# Patient Record
Sex: FEMALE | Race: WHITE | NOT HISPANIC OR LATINO | Employment: OTHER | URBAN - METROPOLITAN AREA
[De-identification: names, ages, dates, MRNs, and addresses within clinical notes are randomized per-mention and may not be internally consistent; named-entity substitution may affect disease eponyms.]

---

## 2019-03-12 ENCOUNTER — TRANSFERRED RECORDS (OUTPATIENT)
Dept: HEALTH INFORMATION MANAGEMENT | Facility: CLINIC | Age: 64
End: 2019-03-12

## 2019-04-24 ENCOUNTER — TRANSFERRED RECORDS (OUTPATIENT)
Dept: HEALTH INFORMATION MANAGEMENT | Facility: CLINIC | Age: 64
End: 2019-04-24

## 2022-04-26 ENCOUNTER — TRANSFERRED RECORDS (OUTPATIENT)
Dept: MULTI SPECIALTY CLINIC | Facility: CLINIC | Age: 67
End: 2022-04-26

## 2022-06-18 ENCOUNTER — TRANSFERRED RECORDS (OUTPATIENT)
Dept: HEALTH INFORMATION MANAGEMENT | Facility: CLINIC | Age: 67
End: 2022-06-18

## 2022-11-15 ENCOUNTER — TRANSFERRED RECORDS (OUTPATIENT)
Dept: HEALTH INFORMATION MANAGEMENT | Facility: CLINIC | Age: 67
End: 2022-11-15

## 2023-01-04 ENCOUNTER — TRANSFERRED RECORDS (OUTPATIENT)
Dept: HEALTH INFORMATION MANAGEMENT | Facility: CLINIC | Age: 68
End: 2023-01-04

## 2023-03-01 ENCOUNTER — TRANSFERRED RECORDS (OUTPATIENT)
Dept: MULTI SPECIALTY CLINIC | Facility: CLINIC | Age: 68
End: 2023-03-01

## 2023-08-14 ENCOUNTER — TRANSFERRED RECORDS (OUTPATIENT)
Dept: HEALTH INFORMATION MANAGEMENT | Facility: CLINIC | Age: 68
End: 2023-08-14

## 2024-01-15 ENCOUNTER — TRANSFERRED RECORDS (OUTPATIENT)
Dept: HEALTH INFORMATION MANAGEMENT | Facility: CLINIC | Age: 69
End: 2024-01-15

## 2024-03-22 ENCOUNTER — TRANSFERRED RECORDS (OUTPATIENT)
Dept: HEALTH INFORMATION MANAGEMENT | Facility: CLINIC | Age: 69
End: 2024-03-22

## 2024-03-27 ENCOUNTER — TRANSFERRED RECORDS (OUTPATIENT)
Dept: HEALTH INFORMATION MANAGEMENT | Facility: CLINIC | Age: 69
End: 2024-03-27

## 2024-06-29 ENCOUNTER — HEALTH MAINTENANCE LETTER (OUTPATIENT)
Age: 69
End: 2024-06-29

## 2024-08-20 ENCOUNTER — OFFICE VISIT (OUTPATIENT)
Dept: INTERNAL MEDICINE | Facility: CLINIC | Age: 69
End: 2024-08-20
Payer: COMMERCIAL

## 2024-08-20 VITALS
DIASTOLIC BLOOD PRESSURE: 68 MMHG | TEMPERATURE: 98.2 F | SYSTOLIC BLOOD PRESSURE: 124 MMHG | WEIGHT: 176.1 LBS | HEIGHT: 68 IN | HEART RATE: 77 BPM | BODY MASS INDEX: 26.69 KG/M2 | OXYGEN SATURATION: 99 % | RESPIRATION RATE: 15 BRPM

## 2024-08-20 DIAGNOSIS — E04.2 NONTOXIC MULTINODULAR GOITER: ICD-10-CM

## 2024-08-20 DIAGNOSIS — H40.9 GLAUCOMA OF BOTH EYES, UNSPECIFIED GLAUCOMA TYPE: ICD-10-CM

## 2024-08-20 DIAGNOSIS — H81.13 BENIGN PAROXYSMAL POSITIONAL VERTIGO DUE TO BILATERAL VESTIBULAR DISORDER: Primary | ICD-10-CM

## 2024-08-20 DIAGNOSIS — K21.00 GASTROESOPHAGEAL REFLUX DISEASE WITH ESOPHAGITIS WITHOUT HEMORRHAGE: ICD-10-CM

## 2024-08-20 DIAGNOSIS — R73.03 PREDIABETES: ICD-10-CM

## 2024-08-20 DIAGNOSIS — R13.19 ESOPHAGEAL DYSPHAGIA: ICD-10-CM

## 2024-08-20 DIAGNOSIS — K22.2 SCHATZKI'S RING: ICD-10-CM

## 2024-08-20 PROBLEM — C44.310 BASAL CELL CARCINOMA (BCC) OF SKIN OF FACE: Status: ACTIVE | Noted: 2020-10-22

## 2024-08-20 PROBLEM — Z86.32 HISTORY OF GESTATIONAL DIABETES MELLITUS: Status: ACTIVE | Noted: 2024-08-20

## 2024-08-20 PROBLEM — D12.3 BENIGN NEOPLASM OF TRANSVERSE COLON: Status: ACTIVE | Noted: 2019-04-26

## 2024-08-20 PROBLEM — K20.80: Status: ACTIVE | Noted: 2024-03-27

## 2024-08-20 PROBLEM — Z86.0100 HISTORY OF COLONIC POLYPS: Status: ACTIVE | Noted: 2019-10-07

## 2024-08-20 PROCEDURE — G2211 COMPLEX E/M VISIT ADD ON: HCPCS | Performed by: INTERNAL MEDICINE

## 2024-08-20 PROCEDURE — 99204 OFFICE O/P NEW MOD 45 MIN: CPT | Performed by: INTERNAL MEDICINE

## 2024-08-20 RX ORDER — CALCIUM CARBONATE/VITAMIN D3 500 MG-10
2 TABLET ORAL DAILY
COMMUNITY

## 2024-08-20 RX ORDER — CHOLECALCIFEROL (VITAMIN D3) 50 MCG
TABLET ORAL
COMMUNITY
Start: 2020-11-30

## 2024-08-20 RX ORDER — ZINC GLUCONATE 50 MG
TABLET ORAL
COMMUNITY
Start: 2020-11-30

## 2024-08-20 RX ORDER — ASCORBIC ACID 100 MG
TABLET,CHEWABLE ORAL
COMMUNITY
Start: 2020-11-19 | End: 2024-10-07

## 2024-08-20 RX ORDER — CHLORAL HYDRATE 500 MG
1000 CAPSULE ORAL
COMMUNITY

## 2024-08-20 RX ORDER — MECLIZINE HYDROCHLORIDE 25 MG/1
25 TABLET ORAL 3 TIMES DAILY PRN
Qty: 30 TABLET | Refills: 1 | Status: SHIPPED | OUTPATIENT
Start: 2024-08-20 | End: 2024-10-07

## 2024-08-20 RX ORDER — LATANOPROST 50 UG/ML
SOLUTION/ DROPS OPHTHALMIC
COMMUNITY

## 2024-08-20 RX ORDER — NEOMYCIN SULFATE, POLYMYXIN B SULFATE AND DEXAMETHASONE 3.5; 10000; 1 MG/ML; [USP'U]/ML; MG/ML
SUSPENSION/ DROPS OPHTHALMIC
COMMUNITY
Start: 2024-01-04

## 2024-08-20 NOTE — PROGRESS NOTES
"  Assessment & Plan   Problem List Items Addressed This Visit          Nervous and Auditory    Benign paroxysmal positional vertigo due to bilateral vestibular disorder - Primary     Patient's history and positive Katja-Hallpike on exam consistent with BPPV.  I did discuss this with the patient today.  Possibly onset while she was on a boat in [place] earlier this year.  Given duration of time, I do think seeing PT for Epley maneuvers is likely necessary.  - Vestibular PT ordered  - Info on BPPV and Epley maneuvers provided in AVS  - Prescription for meclizine as needed also given         Relevant Medications    meclizine (ANTIVERT) 25 MG tablet    Other Relevant Orders    Physical Therapy  Referral       Digestive    Esophageal dysphagia     Ongoing. Has hx of Schatzki's rings dilated x3 in the past (2019, 2022 and 2024) along with esophageal abscess 3/2024. She is following with [facility] next follow up is planned in September.  - LISA for [facility] filled out today         Gastroesophageal reflux disease with esophagitis without hemorrhage     Well-controlled with omeprazole 20 mg daily.  Given history of Schatzki's rings that had to be dilated x 3, will continue chronic PPI.         Schatzki's ring     Follows at [facility].  Would plan to continue Protonix 20 mg daily indefinitely.            Endocrine    Nontoxic multinodular goiter     Chronic. Last US 3/5/24, showed stable/smaller thyroid nodules. Largest 1.1 cm - was previously TIRADS4 on US 3/2023 (f/up recommended 1, 2, 3 and 5 years).  - Plan for f/up of the TIRADS4 nodule 3/2026, 3/2027 and 3/2029         Prediabetes     Last A1c 5.9 (2/2024).  Discussed healthy diet and exercise for management.            Other    Glaucoma     Follows with ophthalmology for eyedrops.             BMI  Estimated body mass index is 26.97 kg/m  as calculated from the following:    Height as of this encounter: 1.721 m (5' 7.75\").    Weight as of this encounter: 79.9 kg (176 lb 1.6 " oz).   Weight management plan: Discussed healthy diet and exercise guidelines    Review of prior external note(s) from - [facility]Everywhere information from [facility] reviewed  Prescription drug management      The longitudinal plan of care for the diagnosis(es)/condition(s) as documented were addressed during this visit. Due to the added complexity in care, I will continue to support Merry in the subsequent management and with ongoing continuity of care.    FUTURE APPOINTMENTS:       - Follow-up visit in 2 months with colleague to ensure dizziness resolved with PT, then me in February for wellness       Subjective   Merry is a 68 year old, presenting for the following health issues:  Establish Care        8/20/2024     4:12 PM   Additional Questions   Roomed by Allie     Via the Health Maintenance questionnaire, the patient has reported the following services have been completed -Colonscopy: [place] 2022-04-26, this information has been sent to the abstraction team.    History of Present Illness     Reason for visit:  To establish visit with new doctor and confirm my heart issues are not concerning    We reviewed her medical history as below along with current concerns. I also reviewed her outside chart from [facility].     Esophageal abscess: Admitted with dysphagia 3/22/2024.  Upper endoscopy and EUS showed submucosal cystic lesions that was biopsied and drained. Final pathology from the biopsies showed benign squamous mucosa with associated inflammation and granulation tissue, consistant with submucosal abscess.   - Does have ongoing dysphagia, following with [facility], they might do barium, next f/up is September     Schatzki's ring: cold forcep disruption (2019), dilation 2022 and 2024.     GERD: omeprazole 20 mg daily     Glaucoma: Drops through ophthalmology    Pre-DM: A1c 5.9 (2/2024). Also hx of gestational DM with both pregnancies. Walks for exercise occasionally. Babysits 2 year old granddaughter.  "    Dizziness / one syncopal episode: TTE 3/23/24 LVEF 71%, normal RV size and function, no valve disease. Negative exercise stress 4/30/24.   - Had an episode last week Friday where she stood up from standing, walked over to take a picture, lost balance and fell over. No LOC. Got right up and felt fine.   - Lately has felt \"wonky\". Describes feeling dizzy if she turns too quick. Since January when she was in [place]. Few times a week.   - Occasionally heart racing when she is trying to relax at end of day, resolves with going to sleep   - 48-60 oz of water most days and a G2 in addition to that to help with cramping/electrolytes   - Some dizziness sitting her today, describes as room spinning.     Multinodular goiter: Last US 3/5/24, showed stable/smaller thyroid nodules. Largest 1.1 cm - was previously TIRADS4 on US 3/2023 (f/up recommended 1, 2, 3 and 5 years).    HCM: Mammo BIRADS1 3/5/24. DEXA normal 3/2023.     She eats 2-3 servings of fruits and vegetables daily.She consumes 1 sweetened beverage(s) daily.She exercises with enough effort to increase her heart rate 30 to 60 minutes per day.  She exercises with enough effort to increase her heart rate 3 or less days per week.   She is taking medications regularly.      Review of Systems  Constitutional, neuro, ENT, endocrine, pulmonary, cardiac, gastrointestinal, genitourinary, musculoskeletal, integument and psychiatric systems are negative, except as otherwise noted.      Objective    /68   Pulse 77   Temp 98.2  F (36.8  C) (Oral)   Resp 15   Ht 1.721 m (5' 7.75\")   Wt 79.9 kg (176 lb 1.6 oz)   SpO2 99%   BMI 26.97 kg/m    Body mass index is 26.97 kg/m .  Physical Exam   GENERAL: alert and no distress  EYES: Eyes grossly normal to inspection and conjunctivae and sclerae normal  HENT: nose and mouth without ulcers or lesions  NECK: no adenopathy, no asymmetry, masses, or scars  RESP: lungs clear to auscultation - no rales, rhonchi or " wheezes  CV: regular rate and rhythm, normal S1 S2, no S3 or S4, no murmur, click or rub, no peripheral edema  MS: no gross musculoskeletal defects noted, no edema  SKIN: no suspicious lesions or rashes on exposed skin   NEURO: Normal strength and tone, mentation intact and speech normal; +[place] Hallpike when patient turned to her R with nystagmus and onset of dizziness  PSYCH: mentation appears normal, affect normal/bright            Signed Electronically by: Noris Rojas MD

## 2024-08-22 NOTE — ASSESSMENT & PLAN NOTE
Patient's history and positive Katja-Hallpike on exam consistent with BPPV.  I did discuss this with the patient today.  Possibly onset while she was on a boat in [place] earlier this year.  Given duration of time, I do think seeing PT for Epley maneuvers is likely necessary.  - Vestibular PT ordered  - Info on BPPV and Epley maneuvers provided in AVS  - Prescription for meclizine as needed also given

## 2024-08-22 NOTE — ASSESSMENT & PLAN NOTE
Ongoing. Has hx of Schatzki's rings dilated x3 in the past (2019, 2022 and 2024) along with esophageal abscess 3/2024. She is following with [facility] next follow up is planned in September.  - LISA for [facility] filled out today

## 2024-08-22 NOTE — ASSESSMENT & PLAN NOTE
Chronic. Last US 3/5/24, showed stable/smaller thyroid nodules. Largest 1.1 cm - was previously TIRADS4 on US 3/2023 (f/up recommended 1, 2, 3 and 5 years).  - Plan for f/up of the TIRADS4 nodule 3/2026, 3/2027 and 3/2029

## 2024-08-22 NOTE — ASSESSMENT & PLAN NOTE
Well-controlled with omeprazole 20 mg daily.  Given history of Schatzki's rings that had to be dilated x 3, will continue chronic PPI.

## 2024-08-22 NOTE — ASSESSMENT & PLAN NOTE
Follows at [facility].  Would plan to continue Protonix 20 mg daily indefinitely.

## 2024-09-28 ENCOUNTER — ANCILLARY ORDERS (OUTPATIENT)
Dept: CT IMAGING | Facility: HOSPITAL | Age: 69
End: 2024-09-28

## 2024-09-28 DIAGNOSIS — R10.32 LEFT LOWER QUADRANT PAIN: Primary | ICD-10-CM

## 2024-09-28 DIAGNOSIS — R13.10 DYSPHAGIA, UNSPECIFIED TYPE: ICD-10-CM

## 2024-10-07 ENCOUNTER — OFFICE VISIT (OUTPATIENT)
Dept: FAMILY MEDICINE | Facility: CLINIC | Age: 69
End: 2024-10-07
Payer: COMMERCIAL

## 2024-10-07 VITALS
TEMPERATURE: 98 F | WEIGHT: 176 LBS | HEIGHT: 68 IN | DIASTOLIC BLOOD PRESSURE: 83 MMHG | RESPIRATION RATE: 18 BRPM | SYSTOLIC BLOOD PRESSURE: 125 MMHG | OXYGEN SATURATION: 95 % | HEART RATE: 89 BPM | BODY MASS INDEX: 26.67 KG/M2

## 2024-10-07 DIAGNOSIS — R13.19 ESOPHAGEAL DYSPHAGIA: ICD-10-CM

## 2024-10-07 DIAGNOSIS — Z01.818 PRE-OP EVALUATION: Primary | ICD-10-CM

## 2024-10-07 DIAGNOSIS — R73.03 PREDIABETES: ICD-10-CM

## 2024-10-07 LAB
ANION GAP SERPL CALCULATED.3IONS-SCNC: 10 MMOL/L (ref 7–15)
ATRIAL RATE - MUSE: 68 BPM
BUN SERPL-MCNC: 12.2 MG/DL (ref 8–23)
CALCIUM SERPL-MCNC: 9.5 MG/DL (ref 8.8–10.4)
CHLORIDE SERPL-SCNC: 103 MMOL/L (ref 98–107)
CREAT SERPL-MCNC: 0.81 MG/DL (ref 0.51–0.95)
DIASTOLIC BLOOD PRESSURE - MUSE: NORMAL MMHG
EGFRCR SERPLBLD CKD-EPI 2021: 79 ML/MIN/1.73M2
EST. AVERAGE GLUCOSE BLD GHB EST-MCNC: 123 MG/DL
GLUCOSE SERPL-MCNC: 121 MG/DL (ref 70–99)
HBA1C MFR BLD: 5.9 % (ref 0–5.6)
HCO3 SERPL-SCNC: 26 MMOL/L (ref 22–29)
INTERPRETATION ECG - MUSE: NORMAL
P AXIS - MUSE: 37 DEGREES
POTASSIUM SERPL-SCNC: 4.7 MMOL/L (ref 3.4–5.3)
PR INTERVAL - MUSE: 136 MS
QRS DURATION - MUSE: 74 MS
QT - MUSE: 378 MS
QTC - MUSE: 401 MS
R AXIS - MUSE: 9 DEGREES
SODIUM SERPL-SCNC: 139 MMOL/L (ref 135–145)
SYSTOLIC BLOOD PRESSURE - MUSE: NORMAL MMHG
T AXIS - MUSE: 26 DEGREES
VENTRICULAR RATE- MUSE: 68 BPM

## 2024-10-07 PROCEDURE — 99214 OFFICE O/P EST MOD 30 MIN: CPT | Performed by: FAMILY MEDICINE

## 2024-10-07 PROCEDURE — 83036 HEMOGLOBIN GLYCOSYLATED A1C: CPT | Performed by: FAMILY MEDICINE

## 2024-10-07 PROCEDURE — 80048 BASIC METABOLIC PNL TOTAL CA: CPT | Performed by: FAMILY MEDICINE

## 2024-10-07 PROCEDURE — 93005 ELECTROCARDIOGRAM TRACING: CPT | Performed by: FAMILY MEDICINE

## 2024-10-07 PROCEDURE — 36415 COLL VENOUS BLD VENIPUNCTURE: CPT | Performed by: FAMILY MEDICINE

## 2024-10-07 PROCEDURE — 93010 ELECTROCARDIOGRAM REPORT: CPT | Performed by: INTERNAL MEDICINE

## 2024-10-07 PROCEDURE — G2211 COMPLEX E/M VISIT ADD ON: HCPCS | Performed by: FAMILY MEDICINE

## 2024-10-07 ASSESSMENT — PAIN SCALES - GENERAL: PAINLEVEL: NO PAIN (0)

## 2024-10-07 NOTE — PROGRESS NOTES
Preoperative Evaluation  [place] [id number] [address]  Phone: [phone]  Fax: [phone]  Primary Provider: Noris Rojas MD  Pre-op Performing Provider: Obinna Shipley MD  Oct 7, 2024             10/2/2024   Surgical Information   What procedure is being done? Ultrasound Endoscopy   Facility or Hospital where procedure/surgery will be performed: [facility]   Who is doing the procedure / surgery? Jadiel Smith   Date of surgery / procedure: 10/22/2024   Time of surgery / procedure: 1:00pm   Where do you plan to recover after surgery? at home with family        Fax number for surgical facility: [phone]    Assessment & Plan     The proposed surgical procedure is considered LOW risk.    Problem List Items Addressed This Visit       Esophageal dysphagia    Prediabetes    Relevant Orders    Hemoglobin A1c (Completed)     Other Visit Diagnoses       Pre-op evaluation    -  Primary    Relevant Orders    EKG 12-lead, tracing only (Completed)    CBC with platelets    Basic metabolic panel (Completed)    CBC with platelets                - No identified additional risk factors other than previously addressed    Antiplatelet or Anticoagulation Medication Instructions   - Patient is on no antiplatelet or anticoagulation medications.    Additional Medication Instructions  Take all scheduled medications on the day of surgery    Recommendation  Approval given to proceed with proposed procedure, without further diagnostic evaluation.      The longitudinal plan of care for the diagnosis(es)/condition(s) as documented were addressed during this visit. Due to the added complexity in care, I will continue to support Merry in the subsequent management and with ongoing continuity of care.        Hina Sousa is a 69 year old, presenting for the following:  Pre-Op Exam          10/7/2024    12:01 PM   Additional Questions    Roomed by IGLESIA Phillips   Accompanied by Self     HPI related to upcoming procedure: dysphagia   history of submucosal abscess.           10/2/2024   Pre-Op Questionnaire   Have you ever had a heart attack or stroke? No   Have you ever had surgery on your heart or blood vessels, such as a stent placement, a coronary artery bypass, or surgery on an artery in your head, neck, heart, or legs? No   Do you have chest pain with activity? No   Do you have a history of heart failure? No   Do you currently have a cold, bronchitis or symptoms of other infection? No   Do you have a cough, shortness of breath, or wheezing? No   Do you or anyone in your family have previous history of blood clots? No   Do you or does anyone in your family have a serious bleeding problem such as prolonged bleeding following surgeries or cuts? (!) YES    Have you ever had problems with anemia or been told to take iron pills? No   Have you had any abnormal blood loss such as black, tarry or bloody stools, or abnormal vaginal bleeding? No   Have you ever had a blood transfusion? No   Are you willing to have a blood transfusion if it is medically needed before, during, or after your surgery? Yes   Have you or any of your relatives ever had problems with anesthesia? No   Do you have sleep apnea, excessive snoring or daytime drowsiness? No   Do you have any artifical heart valves or other implanted medical devices like a pacemaker, defibrillator, or continuous glucose monitor? No   Do you have artificial joints? No   Are you allergic to latex? No        Health Care Directive  Patient does not have a Health Care Directive or Living Will: Discussed advance care planning with patient; information given to patient to review.    Preoperative Review of    reviewed - no record of controlled substances prescribed.          Patient Active Problem List    Diagnosis Date Noted    History of gestational diabetes mellitus 08/20/2024     Priority: Medium     Benign paroxysmal positional vertigo due to bilateral vestibular disorder 08/20/2024     Priority: Medium    Abscess of esophagus 03/27/2024     Priority: Medium     Formatting of this note might be different from the original.   Noted on EGD 3/2024, biopsies/EUS done.  Pathology showed submucosal abscess, which was biopsied/drained at the time of EUS, benign pathology.      Esophageal dysphagia 03/23/2024     Priority: Medium    Glaucoma 02/20/2024     Priority: Medium    Basal cell carcinoma (BCC) of skin of face 10/22/2020     Priority: Medium     Formatting of this note might be different from the original.   Right shoulder - [facility]      History of colonic polyps 10/07/2019     Priority: Medium     Formatting of this note might be different from the original.   Adenomatous polyps 4/2019, 3 year follow up.      Benign neoplasm of transverse colon 04/26/2019     Priority: Medium    Schatzki's ring 04/19/2019     Priority: Medium    Hyperlipidemia 10/07/2015     Priority: Medium    Prediabetes 10/07/2015     Priority: Medium    Gastroesophageal reflux disease with esophagitis without hemorrhage 11/09/2012     Priority: Medium     Formatting of this note might be different from the original.   Overview: well controlled with Pepcid AC.  Has Hiatal Hernia also.      Nontoxic multinodular goiter 11/02/1999     Priority: Medium      No past medical history on file.  No past surgical history on file.  Current Outpatient Medications   Medication Sig Dispense Refill    Ascorbic Acid 250 MG CHEW Take 250 mg by mouth      Calcium Carb-Cholecalciferol 500-10 MG-MCG TABS Take 2 tablets by mouth daily      fish oil-omega-3 fatty acids 1000 MG capsule Take 1,000 mg by mouth      latanoprost (XALATAN) 0.005 % ophthalmic solution INSTILL 1 DROP IN BOTH EYES EVERY EVENING      MAGNESIUM PO Take 200 mg by mouth      omeprazole (PRILOSEC) 20 MG DR capsule       vitamin D3 (CHOLECALCIFEROL) 50 mcg (2000 units) tablet        "zinc gluconate 50 MG tablet       neomycin-polymyxin-dexAMETHasone (MAXITROL) 3.5-39028-3.1 SUSP ophthalmic susp SHAKE LIQUID AND INSTILL 1 DROP IN RIGHT EYE THREE TIMES DAILY         Allergies   Allergen Reactions    Bee Venom Itching and Swelling    Gabapentin Itching        Social History     Tobacco Use    Smoking status: Never    Smokeless tobacco: Never   Substance Use Topics    Alcohol use: Not on file       History   Drug Use Not on file               Objective    /83 (BP Location: Right arm, Patient Position: Sitting, Cuff Size: Adult Regular)   Pulse 89   Temp 98  F (36.7  C) (Oral)   Resp 18   Ht 1.721 m (5' 7.75\")   Wt 79.8 kg (176 lb)   LMP  (LMP Unknown)   SpO2 95%   BMI 26.96 kg/m     Estimated body mass index is 26.96 kg/m  as calculated from the following:    Height as of this encounter: 1.721 m (5' 7.75\").    Weight as of this encounter: 79.8 kg (176 lb).  Physical Exam  GENERAL: alert and no distress  EYES: Eyes grossly normal to inspection, PERRL and conjunctivae and sclerae normal  HENT: oropharynx clear and oral mucous membranes moist  NECK: no adenopathy, no asymmetry, masses, or scars  RESP: lungs clear to auscultation - no rales, rhonchi or wheezes  CV: regular rate and rhythm, normal S1 S2, no S3 or S4, no murmur, click or rub, no peripheral edema  ABDOMEN: soft, nontender, no hepatosplenomegaly, no masses and bowel sounds normal  MS: no gross musculoskeletal defects noted, no edema  SKIN: no suspicious lesions or rashes  NEURO: Normal strength and tone, mentation intact and speech normal  PSYCH: mentation appears normal, affect normal/bright    No results for input(s): \"HGB\", \"PLT\", \"INR\", \"NA\", \"POTASSIUM\", \"CR\", \"A1C\" in the last 8760 hours.     Diagnostics  Recent Results (from the past 48 hour(s))   EKG 12-lead, tracing only    Collection Time: 10/07/24 11:34 AM   Result Value Ref Range    Systolic Blood Pressure  mmHg    Diastolic Blood Pressure  mmHg    Ventricular Rate " 68 BPM    Atrial Rate 68 BPM    TN Interval 136 ms    QRS Duration 74 ms     ms    QTc 401 ms    P Axis 37 degrees    R AXIS 9 degrees    T Axis 26 degrees    Interpretation ECG       Sinus rhythm  Normal ECG  No previous ECGs available  Confirmed by ANTON IYER MD LOC:[facility] ([id number]) on 10/7/2024 5:54:10 PM     Basic metabolic panel    Collection Time: 10/07/24 12:52 PM   Result Value Ref Range    Sodium 139 135 - 145 mmol/L    Potassium 4.7 3.4 - 5.3 mmol/L    Chloride 103 98 - 107 mmol/L    Carbon Dioxide (CO2) 26 22 - 29 mmol/L    Anion Gap 10 7 - 15 mmol/L    Urea Nitrogen 12.2 8.0 - 23.0 mg/dL    Creatinine 0.81 0.51 - 0.95 mg/dL    GFR Estimate 79 >60 mL/min/1.73m2    Calcium 9.5 8.8 - 10.4 mg/dL    Glucose 121 (H) 70 - 99 mg/dL   Hemoglobin A1c    Collection Time: 10/07/24 12:52 PM   Result Value Ref Range    Estimated Average Glucose 123 (H) <117 mg/dL    Hemoglobin A1C 5.9 (H) 0.0 - 5.6 %          Revised Cardiac Risk Index (RCRI)  The patient has the following serious cardiovascular risks for perioperative complications:   - No serious cardiac risks = 0 points     RCRI Interpretation: 0 points: Class I (very low risk - 0.4% complication rate)         Signed Electronically by: Obinna Shipley MD  A copy of this evaluation report is provided to the requesting physician.

## 2024-10-08 ENCOUNTER — LAB (OUTPATIENT)
Dept: LAB | Facility: CLINIC | Age: 69
End: 2024-10-08
Payer: COMMERCIAL

## 2024-10-08 ENCOUNTER — HOSPITAL ENCOUNTER (OUTPATIENT)
Dept: CT IMAGING | Facility: HOSPITAL | Age: 69
Discharge: HOME | End: 2024-10-08
Attending: INTERNAL MEDICINE | Admitting: INTERNAL MEDICINE
Payer: COMMERCIAL

## 2024-10-08 DIAGNOSIS — R13.10 DYSPHAGIA, UNSPECIFIED TYPE: ICD-10-CM

## 2024-10-08 DIAGNOSIS — R10.32 LEFT LOWER QUADRANT PAIN: ICD-10-CM

## 2024-10-08 DIAGNOSIS — Z01.818 PRE-OP EVALUATION: ICD-10-CM

## 2024-10-08 LAB
ERYTHROCYTE [DISTWIDTH] IN BLOOD BY AUTOMATED COUNT: 13 % (ref 10–15)
HCT VFR BLD AUTO: 43.5 % (ref 35–47)
HGB BLD-MCNC: 14.7 G/DL (ref 11.7–15.7)
MCH RBC QN AUTO: 28.7 PG (ref 26.5–33)
MCHC RBC AUTO-ENTMCNC: 33.8 G/DL (ref 31.5–36.5)
MCV RBC AUTO: 85 FL (ref 78–100)
PLATELET # BLD AUTO: 164 10E3/UL (ref 150–450)
RBC # BLD AUTO: 5.12 10E6/UL (ref 3.8–5.2)
WBC # BLD AUTO: 6.8 10E3/UL (ref 4–11)

## 2024-10-08 PROCEDURE — 85027 COMPLETE CBC AUTOMATED: CPT

## 2024-10-08 PROCEDURE — 250N000009 HC RX 250

## 2024-10-08 PROCEDURE — 74177 CT ABD & PELVIS W/CONTRAST: CPT

## 2024-10-08 PROCEDURE — 36415 COLL VENOUS BLD VENIPUNCTURE: CPT

## 2024-10-08 PROCEDURE — 250N000011 HC RX IP 250 OP 636

## 2024-10-08 RX ORDER — IOPAMIDOL 755 MG/ML
86 INJECTION, SOLUTION INTRAVASCULAR ONCE
Status: COMPLETED | OUTPATIENT
Start: 2024-10-08 | End: 2024-10-08

## 2024-10-08 RX ADMIN — IOPAMIDOL 86 ML: 755 INJECTION, SOLUTION INTRAVENOUS at 11:21

## 2024-10-08 RX ADMIN — SODIUM CHLORIDE 72 ML: 9 INJECTION, SOLUTION INTRAVENOUS at 11:23

## 2024-10-13 ENCOUNTER — TRANSFERRED RECORDS (OUTPATIENT)
Dept: HEALTH INFORMATION MANAGEMENT | Facility: CLINIC | Age: 69
End: 2024-10-13
Payer: COMMERCIAL

## 2024-10-14 ENCOUNTER — TELEPHONE (OUTPATIENT)
Dept: INTERNAL MEDICINE | Facility: CLINIC | Age: 69
End: 2024-10-14
Payer: COMMERCIAL

## 2024-10-14 NOTE — TELEPHONE ENCOUNTER
Reason for Call:  Appointment Request    Patient requesting this type of appt: Chronic Diease Management/Medication/Follow-Up    Requested provider: Noris Rojas    Reason patient unable to be scheduled: Not within requested timeframe    When does patient want to be seen/preferred time: Same day    Comments: N/A    Could we send this information to you in [employer] [facility] or would you prefer to receive a phone call?:   Patient would prefer a phone call   Okay to leave a detailed message?: Yes at Cell number on file:    Telephone Information:   Mobile [phone]       Call taken on 10/14/2024 at 10:35 AM by Mary Ann Wu

## 2024-10-15 NOTE — TELEPHONE ENCOUNTER
Patient calling back     Patient was already informed about her appt with May     -Patient and writer agreed the message was taken but the pcp is out and message was replied by team

## 2024-10-15 NOTE — TELEPHONE ENCOUNTER
Left message for patient to call back. It appears patient has appt scheduled with colleague of Dr. Izquierdo which I would keep since Bob is out for the next 3months.

## 2024-10-24 ENCOUNTER — OFFICE VISIT (OUTPATIENT)
Dept: INTERNAL MEDICINE | Facility: CLINIC | Age: 69
End: 2024-10-24
Payer: COMMERCIAL

## 2024-10-24 VITALS
WEIGHT: 168 LBS | HEIGHT: 68 IN | HEART RATE: 68 BPM | OXYGEN SATURATION: 98 % | RESPIRATION RATE: 18 BRPM | SYSTOLIC BLOOD PRESSURE: 127 MMHG | DIASTOLIC BLOOD PRESSURE: 73 MMHG | BODY MASS INDEX: 25.46 KG/M2

## 2024-10-24 DIAGNOSIS — R93.89 ABNORMAL CHEST CT: Primary | ICD-10-CM

## 2024-10-24 DIAGNOSIS — R68.89 NONSPECIFIC ABNORMAL FINDING: ICD-10-CM

## 2024-10-24 PROCEDURE — 99213 OFFICE O/P EST LOW 20 MIN: CPT | Performed by: NURSE PRACTITIONER

## 2024-10-24 RX ORDER — POLYETHYLENE GLYCOL 3350 17 G/17G
1 POWDER, FOR SOLUTION ORAL DAILY
COMMUNITY

## 2024-10-24 ASSESSMENT — PAIN SCALES - GENERAL: PAINLEVEL_OUTOF10: NO PAIN (0)

## 2024-10-24 NOTE — PROGRESS NOTES
"  Assessment & Plan   Problem List Items Addressed This Visit    None  Visit Diagnoses       Abnormal chest CT    -  Primary    Normal endoscopic ultrasound of lower gastrointestinal tract            - We reviewed the report from her recent CT scan which showed either lung atelectasis or scarring.  I find an old report where atelectasis is mentioned, there is no prior report of scarring.  The significance of this finding is undetermined but she is not experiencing any respiratory symptoms.  She is advised to be alert to any changes such as coughing, shortness of breath, chest tightness.  Further management dependent on development of any symptoms but no specific follow-up is recommended based on these imaging findings alone  - We reviewed the recent endoscopic ultrasound showing \"nonspecific pancreatic parenchymal changes\".  Recent CT imaging is negative for any pancreatic abnormalities.  No left upper quadrant pain.  She is advised to follow-up with GI regarding these nonspecific findings.    Return in about 4 months (around 2/24/2025) for AWV with PCP .      Hina Sousa is a 69 year old, presenting for the following health issues:  Results (CT follow up )        10/24/2024     1:19 PM   Additional Questions   Roomed by IGLESIA Phillips   Accompanied by Self       Via the Health Maintenance questionnaire, the patient has reported the following services have been completed , this information has been sent to the abstraction team.      History of Present Illness       Reason for visit:  Follow up to a finding from CT Scan  Symptom onset:  More than a month  Symptoms include:  No symptoms  Had these symptoms before:  No   She is taking medications regularly.     She has questions from her recent CT scan and endoscopic ultrasound.        Objective    /73 (BP Location: Right arm, Patient Position: Sitting, Cuff Size: Adult Large)   Pulse 68   Resp 18   Ht 1.721 m (5' 7.75\")   Wt 76.2 kg (168 lb)   LMP "  (LMP Unknown)   SpO2 98%   BMI 25.73 kg/m    Body mass index is 25.73 kg/m .  Physical Exam   GENERAL: alert and no distress  RESP: lungs clear to auscultation - no rales, rhonchi or wheezes  CV: regular rate and rhythm, normal S1 S2            Signed Electronically by: May Bailey NP

## 2024-12-10 ENCOUNTER — TRANSFERRED RECORDS (OUTPATIENT)
Dept: HEALTH INFORMATION MANAGEMENT | Facility: CLINIC | Age: 69
End: 2024-12-10
Payer: COMMERCIAL

## 2025-01-20 ENCOUNTER — OFFICE VISIT (OUTPATIENT)
Dept: INTERNAL MEDICINE | Facility: CLINIC | Age: 70
End: 2025-01-20
Payer: COMMERCIAL

## 2025-01-20 VITALS
RESPIRATION RATE: 16 BRPM | HEIGHT: 67 IN | SYSTOLIC BLOOD PRESSURE: 122 MMHG | DIASTOLIC BLOOD PRESSURE: 64 MMHG | TEMPERATURE: 98 F | BODY MASS INDEX: 27.2 KG/M2 | OXYGEN SATURATION: 97 % | HEART RATE: 64 BPM | WEIGHT: 173.3 LBS

## 2025-01-20 DIAGNOSIS — K21.00 GASTROESOPHAGEAL REFLUX DISEASE WITH ESOPHAGITIS WITHOUT HEMORRHAGE: ICD-10-CM

## 2025-01-20 DIAGNOSIS — R13.19 ESOPHAGEAL DYSPHAGIA: ICD-10-CM

## 2025-01-20 DIAGNOSIS — K44.9 PARAESOPHAGEAL HERNIA: ICD-10-CM

## 2025-01-20 DIAGNOSIS — Z01.818 PREOP GENERAL PHYSICAL EXAM: Primary | ICD-10-CM

## 2025-01-20 DIAGNOSIS — E04.2 NONTOXIC MULTINODULAR GOITER: ICD-10-CM

## 2025-01-20 PROBLEM — K20.80: Status: RESOLVED | Noted: 2024-03-27 | Resolved: 2025-01-20

## 2025-01-20 LAB
ANION GAP SERPL CALCULATED.3IONS-SCNC: 9 MMOL/L (ref 7–15)
BUN SERPL-MCNC: 15.3 MG/DL (ref 8–23)
CALCIUM SERPL-MCNC: 10 MG/DL (ref 8.8–10.4)
CHLORIDE SERPL-SCNC: 102 MMOL/L (ref 98–107)
CREAT SERPL-MCNC: 0.86 MG/DL (ref 0.51–0.95)
EGFRCR SERPLBLD CKD-EPI 2021: 74 ML/MIN/1.73M2
ERYTHROCYTE [DISTWIDTH] IN BLOOD BY AUTOMATED COUNT: 12.3 % (ref 10–15)
GLUCOSE SERPL-MCNC: 104 MG/DL (ref 70–99)
HCO3 SERPL-SCNC: 29 MMOL/L (ref 22–29)
HCT VFR BLD AUTO: 43 % (ref 35–47)
HGB BLD-MCNC: 14.6 G/DL (ref 11.7–15.7)
MCH RBC QN AUTO: 28.1 PG (ref 26.5–33)
MCHC RBC AUTO-ENTMCNC: 34 G/DL (ref 31.5–36.5)
MCV RBC AUTO: 83 FL (ref 78–100)
PLATELET # BLD AUTO: 176 10E3/UL (ref 150–450)
POTASSIUM SERPL-SCNC: 4.5 MMOL/L (ref 3.4–5.3)
RBC # BLD AUTO: 5.19 10E6/UL (ref 3.8–5.2)
SODIUM SERPL-SCNC: 140 MMOL/L (ref 135–145)
WBC # BLD AUTO: 5.4 10E3/UL (ref 4–11)

## 2025-01-20 PROCEDURE — 99213 OFFICE O/P EST LOW 20 MIN: CPT | Performed by: INTERNAL MEDICINE

## 2025-01-20 PROCEDURE — 93010 ELECTROCARDIOGRAM REPORT: CPT | Performed by: INTERNAL MEDICINE

## 2025-01-20 PROCEDURE — 36415 COLL VENOUS BLD VENIPUNCTURE: CPT | Performed by: INTERNAL MEDICINE

## 2025-01-20 PROCEDURE — 85027 COMPLETE CBC AUTOMATED: CPT | Performed by: INTERNAL MEDICINE

## 2025-01-20 PROCEDURE — 80048 BASIC METABOLIC PNL TOTAL CA: CPT | Performed by: INTERNAL MEDICINE

## 2025-01-20 PROCEDURE — 93005 ELECTROCARDIOGRAM TRACING: CPT | Performed by: INTERNAL MEDICINE

## 2025-01-20 NOTE — PROGRESS NOTES
Preoperative Evaluation  [facility]  [address] [address]  Phone: [phone]  Fax: [phone]  Primary Provider: Noris Rojas MD  Pre-op Performing Provider: Noris Rojas MD  Jan 20, 2025         1/15/2025   Surgical Information   What procedure is being done? Hiatal hernia repair with fundoplication   Facility or Hospital where procedure/surgery will be performed: [facility]   Who is doing the procedure / surgery? Benito Garcia   Date of surgery / procedure: 2/4/2025   Time of surgery / procedure: Noon   Where do you plan to recover after surgery? at home with family     Fax number for surgical facility: [phone]    Assessment & Plan     The proposed surgical procedure is considered INTERMEDIATE risk.    Problem List Items Addressed This Visit          Respiratory    Paraesophageal hernia     Found on CT scan 10/2024. B/c of this component will be getting repair with Dr. Garcia. Scheduld 2/4/25.             Digestive    Esophageal dysphagia     Ongoing, esophageal dysmotility seen on manometry 12/2024.   - Continue to follow with [facility], may see their esophageal specialist  - Continue with lifestyle modifications  - Continue omeprazole 20 mg daily          Gastroesophageal reflux disease with esophagitis without hemorrhage     Reasonably-controlled with omeprazole 20 mg daily.  Given history of Schatzki's rings that had to be dilated x 3, will continue chronic PPI.  - Fundoplication may help with this  - Continue omeprazole and lifestyle modifications            Endocrine    Nontoxic multinodular goiter     Chronic. Last US 3/5/24, showed stable/smaller thyroid nodules. Largest 1.1 cm - was previously TIRADS4 on US 3/2023 (f/up recommended 1, 2, 3 and 5 years).  - Next US due 3/2025, will order at physical             Other    Preop general physical exam - Primary     Here for pre-op prior to paraesophageal hiatal  hernia repair. Very healthy otherwise.   - EKG today with sinus bradycardia, stable  - BMP and CBC WNL  - Hold times for meds below  - Approval given         Relevant Orders    Basic metabolic panel (Completed)    CBC with platelets (Completed)    EKG 12-lead, tracing only (Completed)        - No identified additional risk factors other than previously addressed    Antiplatelet or Anticoagulation Medication Instructions   - Patient is on no antiplatelet or anticoagulation medications.    Additional Medication Instructions   - Herbal medications and vitamins: DO NOT TAKE 14 days prior to surgery.    Recommendation  Approval given to proceed with proposed procedure, without further diagnostic evaluation.    Hina Sousa is a 69 year old, presenting for the following:  Pre-Op Exam          1/20/2025    10:14 AM   Additional Questions   Roomed by Trace DESAI MA     HPI related to upcoming procedure: Longstanding dysphagia and GERD. Known large hiatal hernia for 35 years. Met with Dr. Garcia 11/2024.  Esophagram that day did show a paraesophageal component and a sliding component to her hiatal hernia.  Manometry 12/2024 showed ineffective esophageal motility. Getting hiatal hernia repair with Dr. Garcia.  He will be doing a toupee fundoplication with a loose wrap due to the esophageal dysmotility.         1/15/2025   Pre-Op Questionnaire   Have you ever had a heart attack or stroke? No   Have you ever had surgery on your heart or blood vessels, such as a stent placement, a coronary artery bypass, or surgery on an artery in your head, neck, heart, or legs? No   Do you have chest pain with activity? No   Do you have a history of heart failure? No   Do you currently have a cold, bronchitis or symptoms of other infection? No   Do you have a cough, shortness of breath, or wheezing? No   Do you or anyone in your family have previous history of blood clots? No   Do you or does anyone in your family have a serious bleeding  problem such as prolonged bleeding following surgeries or cuts? (!) YES - family    Have you ever had problems with anemia or been told to take iron pills? No   Have you had any abnormal blood loss such as black, tarry or bloody stools, or abnormal vaginal bleeding? No   Have you ever had a blood transfusion? No   Are you willing to have a blood transfusion if it is medically needed before, during, or after your surgery? Yes   Have you or any of your relatives ever had problems with anesthesia? No   Do you have sleep apnea, excessive snoring or daytime drowsiness? No   Do you have any artifical heart valves or other implanted medical devices like a pacemaker, defibrillator, or continuous glucose monitor? No   Do you have artificial joints? No   Are you allergic to latex? No     Health Care Directive  Patient does not have a Health Care Directive: Discussed advance care planning with patient; information given to patient to review.    Preoperative Review of    reviewed - no record of controlled substances prescribed.        Patient Active Problem List    Diagnosis Date Noted    Preop general physical exam 01/20/2025     Priority: Medium    Paraesophageal hernia 10/08/2024     Priority: Medium     CT Scan October 8, 2024  Endoscopy Ultrasound of the esophagus 10/22/2024      History of gestational diabetes mellitus 08/20/2024     Priority: Medium    Benign paroxysmal positional vertigo due to bilateral vestibular disorder 08/20/2024     Priority: Medium    Esophageal dysphagia 03/23/2024     Priority: Medium    Glaucoma 02/20/2024     Priority: Medium    Basal cell carcinoma (BCC) of skin of face 10/22/2020     Priority: Medium     Formatting of this note might be different from the original.   Right shoulder - [facility]      History of colonic polyps 10/07/2019     Priority: Medium     Formatting of this note might be different from the original.   Adenomatous polyps 4/2019, 3 year follow up.       Benign neoplasm of transverse colon 04/26/2019     Priority: Medium    Schatzki's ring 04/19/2019     Priority: Medium    Hyperlipidemia 10/07/2015     Priority: Medium    Prediabetes 10/07/2015     Priority: Medium    Gastroesophageal reflux disease with esophagitis without hemorrhage 11/09/2012     Priority: Medium     Formatting of this note might be different from the original.   Overview: well controlled with Pepcid AC.  Has Hiatal Hernia also.      Nontoxic multinodular goiter 11/02/1999     Priority: Medium      Past Medical History:   Diagnosis Date    Abscess of esophagus 03/27/2024    Formatting of this note might be different from the original.   Noted on EGD 3/2024, biopsies/EUS done.  Pathology showed submucosal abscess, which was biopsied/drained at the time of EUS, benign pathology.      Arthritis      Past Surgical History:   Procedure Laterality Date    ABDOMEN SURGERY      BACK SURGERY      BIOPSY      CHOLECYSTECTOMY      COLONOSCOPY      ENT SURGERY      GENITOURINARY SURGERY      GYN SURGERY      ORTHOPEDIC SURGERY       Current Outpatient Medications   Medication Sig Dispense Refill    Ascorbic Acid 250 MG CHEW Take 250 mg by mouth      latanoprost (XALATAN) 0.005 % ophthalmic solution INSTILL 1 DROP IN BOTH EYES EVERY EVENING      omeprazole (PRILOSEC) 20 MG DR capsule Take 20 mg by mouth daily.      polyethylene glycol (MIRALAX) 17 g packet Take 1 packet by mouth daily.      vitamin D3 (CHOLECALCIFEROL) 50 mcg (2000 units) tablet       zinc gluconate 50 MG tablet       Calcium Carb-Cholecalciferol 500-10 MG-MCG TABS Take 2 tablets by mouth daily (Patient not taking: Reported on 1/20/2025)      fish oil-omega-3 fatty acids 1000 MG capsule Take 1,000 mg by mouth (Patient not taking: Reported on 1/20/2025)         Allergies   Allergen Reactions    Bee Venom Itching and Swelling    Gabapentin Itching        Social History     Tobacco Use    Smoking status: Former     Current packs/day: 0.00      "Average packs/day: 1 pack/day for 20.0 years (20.0 ttl pk-yrs)     Types: Cigarettes     Start date: 1973     Quit date: 3/11/1987     Years since quittin.8    Smokeless tobacco: Never   Substance Use Topics    Alcohol use: Yes     Comment: Very little     Family History   Problem Relation Age of Onset    Osteoporosis Mother     Coronary Artery Disease Father     Diabetes Maternal Grandfather     Diabetes Sister     Osteoporosis Sister     Colon Cancer Brother      History   Drug Use Unknown             Review of Systems  Constitutional, neuro, ENT, endocrine, pulmonary, cardiac, gastrointestinal, genitourinary, musculoskeletal, integument and psychiatric systems are negative, except as otherwise noted.    Objective    /64   Pulse 64   Temp 98  F (36.7  C) (Oral)   Resp 16   Ht 1.698 m (5' 6.85\")   Wt 78.6 kg (173 lb 4.8 oz)   LMP  (LMP Unknown)   SpO2 97%   BMI 27.26 kg/m     Estimated body mass index is 27.26 kg/m  as calculated from the following:    Height as of this encounter: 1.698 m (5' 6.85\").    Weight as of this encounter: 78.6 kg (173 lb 4.8 oz).  Physical Exam  GENERAL: alert and no distress  EYES: Eyes grossly normal to inspection and conjunctivae and sclerae normal  HENT: ear canals and TM's normal, nose and mouth without ulcers or lesions  NECK: +known thyroid nodules, L>R  RESP: lungs clear to auscultation - no rales, rhonchi or wheezes  CV: regular rate and rhythm, normal S1 S2, no S3 or S4, no murmur, click or rub, no peripheral edema  ABDOMEN: soft, nontender  MS: no gross musculoskeletal defects noted, no edema  SKIN: no suspicious lesions or rashes on exposed skin   NEURO: No focal deficits, mentation intact and speech normal  PSYCH: mentation appears normal, affect normal/bright    Recent Labs   Lab Test 10/08/24  1043 10/07/24  1252   HGB 14.7  --      --    NA  --  139   POTASSIUM  --  4.7   CR  --  0.81   A1C  --  5.9*        Diagnostics  Recent Results (from " the past 24 hours)   EKG 12-lead, tracing only    Collection Time: 01/20/25  9:40 AM   Result Value Ref Range    Systolic Blood Pressure  mmHg    Diastolic Blood Pressure  mmHg    Ventricular Rate 56 BPM    Atrial Rate 56 BPM    KS Interval 140 ms    QRS Duration 76 ms     ms    QTc 405 ms    P Axis 32 degrees    R AXIS 13 degrees    T Axis 26 degrees    Interpretation ECG       Sinus bradycardia  Otherwise normal ECG  When compared with ECG of 07-Oct-2024 11:34,  No significant change was found     Basic metabolic panel    Collection Time: 01/20/25 10:52 AM   Result Value Ref Range    Sodium 140 135 - 145 mmol/L    Potassium 4.5 3.4 - 5.3 mmol/L    Chloride 102 98 - 107 mmol/L    Carbon Dioxide (CO2) 29 22 - 29 mmol/L    Anion Gap 9 7 - 15 mmol/L    Urea Nitrogen 15.3 8.0 - 23.0 mg/dL    Creatinine 0.86 0.51 - 0.95 mg/dL    GFR Estimate 74 >60 mL/min/1.73m2    Calcium 10.0 8.8 - 10.4 mg/dL    Glucose 104 (H) 70 - 99 mg/dL   CBC with platelets    Collection Time: 01/20/25 10:52 AM   Result Value Ref Range    WBC Count 5.4 4.0 - 11.0 10e3/uL    RBC Count 5.19 3.80 - 5.20 10e6/uL    Hemoglobin 14.6 11.7 - 15.7 g/dL    Hematocrit 43.0 35.0 - 47.0 %    MCV 83 78 - 100 fL    MCH 28.1 26.5 - 33.0 pg    MCHC 34.0 31.5 - 36.5 g/dL    RDW 12.3 10.0 - 15.0 %    Platelet Count 176 150 - 450 10e3/uL          Revised Cardiac Risk Index (RCRI)  The patient has the following serious cardiovascular risks for perioperative complications:   - No serious cardiac risks = 0 points     RCRI Interpretation: 0 points: Class I (very low risk - 0.4% complication rate)         Signed Electronically by: Noris Rojas MD  A copy of this evaluation report is provided to the requesting physician.

## 2025-01-20 NOTE — PATIENT INSTRUCTIONS
How to Take Your Medication Before Surgery  Preoperative Medication Instructions   - Skip prilosec AM of surgery  - Skip vitamins starting now, can check on zinc with surgery       Patient Education   Preparing for Your Surgery  For Adults  Getting started  In most cases, a nurse will call to review your health history and instructions. They will give you an arrival time based on your scheduled surgery time. Please be ready to share:  Your doctor's clinic name and phone number  Your medical, surgical, and anesthesia history  A list of allergies and sensitivities  A list of medicines, including herbal treatments and over-the-counter drugs  Whether the patient has a legal guardian (ask how to send us the papers in advance)  Note: You may not receive a call if you were seen at our PAC (Preoperative Assessment Center).  Please tell us if you're pregnant--or if there's any chance you might be pregnant. Some surgeries may injure a fetus (unborn baby), so they require a pregnancy test. Surgeries that are safe for a fetus don't always need a test, and you can choose whether to have one.   Preparing for surgery  Within 10 to 30 days of surgery: Have a pre-op exam (sometimes called an H&P, or History and Physical). This can be done at a clinic or pre-operative center.  If you're having a , you may not need this exam. Talk to your care team.  At your pre-op exam, talk to your care team about all medicines you take. (This includes CBD oil and any drugs, such as THC, marijuana, and other forms of cannabis.) If you need to stop any medicine before surgery, ask when to start taking it again.  This is for your safety. Many medicines and drugs can make you bleed too much during surgery. Some change how well surgery (anesthesia) drugs work.  Call your insurance company to let them know you're having surgery. (If you don't have insurance, call [phone].)  Call your clinic if there's any change in your health. This  includes a scrape or scratch near the surgery site, or any signs of a cold (sore throat, runny nose, cough, rash, fever).  Eating and drinking guidelines  For your safety: Unless your surgeon tells you otherwise, follow the guidelines below.  Eat and drink as normal until 8 hours before you arrive for surgery. After that, no food or milk. You can spit out gum when you arrive.  Drink clear liquids until 2 hours before you arrive. These are liquids you can see through, like water, Gatorade, and Propel Water. They also include plain black coffee and tea (no cream or milk).  No alcohol for 24 hours before you arrive. The night before surgery, stop any drinks that contain THC.  If your care team tells you to take medicine on the morning of surgery, it's okay to take it with a sip of water. No other medicines or drugs are allowed (including CBD oil)--follow your care team's instructions.  If you have questions the day of surgery, call your hospital or surgery center.   Preventing infection  Shower or bathe the night before and the morning of surgery. Follow the instructions your clinic gave you. (If no instructions, use regular soap.)  Don't shave or clip hair near your surgery site. We'll remove the hair if needed.  Don't smoke or vape the morning of surgery. No chewing tobacco for 6 hours before you arrive. A nicotine patch is okay. You may spit out nicotine gum when you arrive.  For some surgeries, the surgeon will tell you to fully quit smoking and nicotine.  We will make every effort to keep you safe from infection. We will:  Clean our hands often with soap and water (or an alcohol-based hand rub).  Clean the skin at your surgery site with a special soap that kills germs.  Give you a special gown to keep you warm. (Cold raises the risk of infection.)  Wear hair covers, masks, gowns, and gloves during surgery.  Give antibiotic medicine, if prescribed. Not all surgeries need this medicine.  What to bring on the day of  surgery  Photo ID and insurance card  Copy of your health care directive, if you have one  Glasses and hearing aids (bring cases)  You can't wear contacts during surgery  Inhaler and eye drops, if you use them (tell us about these when you arrive)  CPAP machine or breathing device, if you use them  A few personal items, if spending the night  If you have . . .  A pacemaker, ICD (cardiac defibrillator), or other implant: Bring the ID card.  An implanted stimulator: Bring the remote control.  A legal guardian: Bring a copy of the certified (court-stamped) guardianship papers.  Please remove any jewelry, including body piercings. Leave jewelry and other valuables at home.  If you're going home the day of surgery  You must have a responsible adult drive you home. They should stay with you overnight as well.  If you don't have someone to stay with you, and you aren't safe to go home alone, we may keep you overnight. Insurance often won't pay for this.  After surgery  If it's hard to control your pain or you need more pain medicine, please call your surgeon's office.  Questions?   If you have any questions for your care team, list them here:   ____________________________________________________________________________________________________________________________________________________________________________________________________________________________________________________________  For informational purposes only. Not to replace the advice of your health care provider. Copyright   2003, 2019 [facility]. All rights reserved. Clinically reviewed by Karl Sanchez MD. "[employer] 741950 - REV 08/24.

## 2025-01-20 NOTE — ASSESSMENT & PLAN NOTE
Ongoing, esophageal dysmotility seen on manometry 12/2024.   - Continue to follow with [facility], may see their esophageal specialist  - Continue with lifestyle modifications  - Continue omeprazole 20 mg daily

## 2025-01-20 NOTE — ASSESSMENT & PLAN NOTE
Found on CT scan 10/2024. B/c of this component will be getting repair with Dr. Garcia. [place] 2/4/25.

## 2025-01-20 NOTE — ASSESSMENT & PLAN NOTE
Reasonably-controlled with omeprazole 20 mg daily.  Given history of Schatzki's rings that had to be dilated x 3, will continue chronic PPI.  - Fundoplication may help with this  - Continue omeprazole and lifestyle modifications

## 2025-01-20 NOTE — ASSESSMENT & PLAN NOTE
Here for pre-op prior to paraesophageal hiatal hernia repair. Very healthy otherwise.   - EKG today with sinus bradycardia, stable  - BMP and CBC WNL  - Hold times for meds below  - Approval given

## 2025-01-20 NOTE — ASSESSMENT & PLAN NOTE
Chronic. Last US 3/5/24, showed stable/smaller thyroid nodules. Largest 1.1 cm - was previously TIRADS4 on US 3/2023 (f/up recommended 1, 2, 3 and 5 years).  - Next US due 3/2025, will order at physical

## 2025-01-21 LAB
ATRIAL RATE - MUSE: 56 BPM
DIASTOLIC BLOOD PRESSURE - MUSE: NORMAL MMHG
INTERPRETATION ECG - MUSE: NORMAL
P AXIS - MUSE: 32 DEGREES
PR INTERVAL - MUSE: 140 MS
QRS DURATION - MUSE: 76 MS
QT - MUSE: 420 MS
QTC - MUSE: 405 MS
R AXIS - MUSE: 13 DEGREES
SYSTOLIC BLOOD PRESSURE - MUSE: NORMAL MMHG
T AXIS - MUSE: 26 DEGREES
VENTRICULAR RATE- MUSE: 56 BPM

## 2025-01-31 ENCOUNTER — TELEPHONE (OUTPATIENT)
Dept: INTERNAL MEDICINE | Facility: CLINIC | Age: 70
End: 2025-01-31

## 2025-01-31 NOTE — TELEPHONE ENCOUNTER
Left VM stating pt is 2 days too early for AWV. Writer suggested calling insurance to see if it will be covered, otherwise to call clinic to get it rescheduled.    Please assist in rescheduling her if she calls back. Thanks,    Jennie Virgen MA on 1/31/2025 at 1:21 PM

## 2025-02-12 ENCOUNTER — TELEPHONE (OUTPATIENT)
Dept: INTERNAL MEDICINE | Facility: CLINIC | Age: 70
End: 2025-02-12

## 2025-02-12 NOTE — TELEPHONE ENCOUNTER
Called pt in regards to message below. Pt states she is not sure why this was sent as she is ok to wait until her appt on 2/14. Does not express any urgency.     Writer advised to call back with any questions.     Pt verbalized understanding.

## 2025-02-12 NOTE — TELEPHONE ENCOUNTER
General Call    Contacts       Contact Date/Time Type Contact Phone/Fax    02/12/2025 08:29 AM CST Phone (Incoming) Merry Peoples (Self) [phone] (M)          Reason for Call:  please look at schedule and let patient know if she can be squeezed into schedule this week because she needs a post op appointment. She failed an appointment with our PA Ann Mena today and really expressed concern to get into her PCP Noris Rojas. Please advise.       Could we send this information to you in [place] or would you prefer to receive a phone call?:   Patient would prefer a phone call   Okay to leave a detailed message?: Yes at Cell number on file:    Telephone Information:   Mobile [phone]

## 2025-02-24 ENCOUNTER — OFFICE VISIT (OUTPATIENT)
Dept: INTERNAL MEDICINE | Facility: CLINIC | Age: 70
End: 2025-02-24
Payer: COMMERCIAL

## 2025-02-24 VITALS
DIASTOLIC BLOOD PRESSURE: 60 MMHG | WEIGHT: 163.8 LBS | SYSTOLIC BLOOD PRESSURE: 108 MMHG | BODY MASS INDEX: 25.71 KG/M2 | RESPIRATION RATE: 12 BRPM | HEIGHT: 67 IN | HEART RATE: 70 BPM | OXYGEN SATURATION: 94 % | TEMPERATURE: 98 F

## 2025-02-24 DIAGNOSIS — K21.00 GASTROESOPHAGEAL REFLUX DISEASE WITH ESOPHAGITIS WITHOUT HEMORRHAGE: ICD-10-CM

## 2025-02-24 DIAGNOSIS — L20.89 OTHER ATOPIC DERMATITIS: ICD-10-CM

## 2025-02-24 DIAGNOSIS — K44.9 PARAESOPHAGEAL HERNIA: ICD-10-CM

## 2025-02-24 DIAGNOSIS — R13.19 ESOPHAGEAL DYSPHAGIA: ICD-10-CM

## 2025-02-24 DIAGNOSIS — E78.2 MIXED HYPERLIPIDEMIA: ICD-10-CM

## 2025-02-24 DIAGNOSIS — Z00.00 ENCOUNTER FOR MEDICARE ANNUAL WELLNESS EXAM: Primary | ICD-10-CM

## 2025-02-24 DIAGNOSIS — R73.03 PREDIABETES: ICD-10-CM

## 2025-02-24 DIAGNOSIS — E04.2 NONTOXIC MULTINODULAR GOITER: ICD-10-CM

## 2025-02-24 DIAGNOSIS — Z12.31 ENCOUNTER FOR SCREENING MAMMOGRAM FOR MALIGNANT NEOPLASM OF BREAST: ICD-10-CM

## 2025-02-24 PROBLEM — Z01.818 PREOP GENERAL PHYSICAL EXAM: Status: RESOLVED | Noted: 2025-01-20 | Resolved: 2025-02-24

## 2025-02-24 PROCEDURE — 99213 OFFICE O/P EST LOW 20 MIN: CPT | Mod: 25 | Performed by: INTERNAL MEDICINE

## 2025-02-24 PROCEDURE — G0438 PPPS, INITIAL VISIT: HCPCS | Performed by: INTERNAL MEDICINE

## 2025-02-24 PROCEDURE — G2211 COMPLEX E/M VISIT ADD ON: HCPCS | Performed by: INTERNAL MEDICINE

## 2025-02-24 RX ORDER — TRIAMCINOLONE ACETONIDE 1 MG/G
OINTMENT TOPICAL 2 TIMES DAILY
Qty: 15 G | Refills: 0 | Status: SHIPPED | OUTPATIENT
Start: 2025-02-24

## 2025-02-24 SDOH — HEALTH STABILITY: PHYSICAL HEALTH: ON AVERAGE, HOW MANY MINUTES DO YOU ENGAGE IN EXERCISE AT THIS LEVEL?: 30 MIN

## 2025-02-24 SDOH — HEALTH STABILITY: PHYSICAL HEALTH: ON AVERAGE, HOW MANY DAYS PER WEEK DO YOU ENGAGE IN MODERATE TO STRENUOUS EXERCISE (LIKE A BRISK WALK)?: 3 DAYS

## 2025-02-24 ASSESSMENT — SOCIAL DETERMINANTS OF HEALTH (SDOH): HOW OFTEN DO YOU GET TOGETHER WITH FRIENDS OR RELATIVES?: TWICE A WEEK

## 2025-02-24 ASSESSMENT — PAIN SCALES - GENERAL: PAINLEVEL_OUTOF10: NO PAIN (0)

## 2025-02-24 NOTE — ASSESSMENT & PLAN NOTE
Reasonably-controlled with omeprazole 20 mg daily. Did just get fundoplication and esophageal hernia repair.   - May be able to taper PPI in the future

## 2025-02-24 NOTE — ASSESSMENT & PLAN NOTE
We discussed healthy lifestyle, nutrition, cardiovascular risk reduction, self care, safety, sunscreen, and timing of cancer screening.  Health maintenance screening and immunizations reviewed with the patient.    - Will update labs today  - She declines PCV20, flu, Tdap or shingles vaccines today   - Mammogram due in march, ordered  - Colonoscopy due 10/2027  - BMD normal 3/2023  - Follow up yearly for the annual physical.

## 2025-02-24 NOTE — PATIENT INSTRUCTIONS
Patient Education   Preventive Care Advice   This is general advice given by our system to help you stay healthy. However, your care team may have specific advice just for you. Please talk to your care team about your preventive care needs.  Nutrition  Eat 5 or more servings of fruits and vegetables each day.  Try wheat bread, brown rice and whole grain pasta (instead of white bread, rice, and pasta).  Get enough calcium and vitamin D. Check the label on foods and aim for 100% of the RDA (recommended daily allowance).  Lifestyle  Exercise at least 150 minutes each week  (30 minutes a day, 5 days a week).  Do muscle strengthening activities 2 days a week. These help control your weight and prevent disease.  No smoking.  Wear sunscreen to prevent skin cancer.  Have a dental exam and cleaning every 6 months.  Yearly exams  See your health care team every year to talk about:  Any changes in your health.  Any medicines your care team has prescribed.  Preventive care, family planning, and ways to prevent chronic diseases.  Shots (vaccines)   HPV shots (up to age 26), if you've never had them before.  Hepatitis B shots (up to age 59), if you've never had them before.  COVID-19 shot: Get this shot when it's due.  Flu shot: Get a flu shot every year.  Tetanus shot: Get a tetanus shot every 10 years.  Pneumococcal, hepatitis A, and RSV shots: Ask your care team if you need these based on your risk.  Shingles shot (for age 50 and up)  General health tests  Diabetes screening:  Starting at age 35, Get screened for diabetes at least every 3 years.  If you are younger than age 35, ask your care team if you should be screened for diabetes.  Cholesterol test: At age 39, start having a cholesterol test every 5 years, or more often if advised.  Bone density scan (DEXA): At age 50, ask your care team if you should have this scan for osteoporosis (brittle bones).  Hepatitis C: Get tested at least once in your life.  STIs (sexually  transmitted infections)  Before age 24: Ask your care team if you should be screened for STIs.  After age 24: Get screened for STIs if you're at risk. You are at risk for STIs (including HIV) if:  You are sexually active with more than one person.  You don't use condoms every time.  You or a partner was diagnosed with a sexually transmitted infection.  If you are at risk for HIV, ask about PrEP medicine to prevent HIV.  Get tested for HIV at least once in your life, whether you are at risk for HIV or not.  Cancer screening tests  Cervical cancer screening: If you have a cervix, begin getting regular cervical cancer screening tests starting at age 21.  Breast cancer scan (mammogram): If you've ever had breasts, begin having regular mammograms starting at age 40. This is a scan to check for breast cancer.  Colon cancer screening: It is important to start screening for colon cancer at age 45.  Have a colonoscopy test every 10 years (or more often if you're at risk) Or, ask your provider about stool tests like a FIT test every year or Cologuard test every 3 years.  To learn more about your testing options, visit:   .  For help making a decision, visit:   https://bit.ly/ew15835.  Prostate cancer screening test: If you have a prostate, ask your care team if a prostate cancer screening test (PSA) at age 55 is right for you.  Lung cancer screening: If you are a current or former smoker ages 50 to 80, ask your care team if ongoing lung cancer screenings are right for you.  For informational purposes only. Not to replace the advice of your health care provider. Copyright   2023 [facility]. All rights reserved. Clinically reviewed by the [facility]. [employer] [id number] - REV 01/24.  Learning About Sleeping Well  What does sleeping well mean?     Sleeping well means getting enough sleep to feel good and stay healthy. How much sleep is enough varies among people.  The number of hours you  sleep and how you feel when you wake up are both important. If you do not feel refreshed, you probably need more sleep. Another sign of not getting enough sleep is feeling tired during the day.  Experts recommend that adults get at least 7 or more hours of sleep per day. Children and older adults need more sleep.  Why is getting enough sleep important?  Getting enough quality sleep is a basic part of good health. When your sleep suffers, your physical health, mood, and your thoughts can suffer too. You may find yourself feeling more grumpy or stressed. Not getting enough sleep also can lead to serious problems, including injury, accidents, anxiety, and depression.  What might cause poor sleeping?  Many things can cause sleep problems, including:  Changes to your sleep schedule.  Stress. Stress can be caused by fear about a single event, such as giving a speech. Or you may have ongoing stress, such as worry about work or school.  Depression, anxiety, and other mental or emotional conditions.  Changes in your sleep habits or surroundings. This includes changes that happen where you sleep, such as noise, light, or sleeping in a different bed. It also includes changes in your sleep pattern, such as having jet lag or working a late shift.  Health problems, such as pain, breathing problems, and restless legs syndrome.  Lack of regular exercise.  Using alcohol, nicotine, or caffeine before bed.  How can you help yourself?  Here are some tips that may help you sleep more soundly and wake up feeling more refreshed.  Your sleeping area   Use your bedroom only for sleeping and sex. A bit of light reading may help you fall asleep. But if it doesn't, do your reading elsewhere in the house. Try not to use your TV, computer, smartphone, or tablet while you are in bed.  Be sure your bed is big enough to stretch out comfortably, especially if you have a sleep partner.  Keep your bedroom quiet, dark, and cool. Use curtains, blinds,  "or a sleep mask to block out light. To block out noise, use earplugs, soothing music, or a \"white noise\" machine.  Your evening and bedtime routine   Create a relaxing bedtime routine. You might want to take a warm shower or bath, or listen to soothing music.  Go to bed at the same time every night. And get up at the same time every morning, even if you feel tired.  What to avoid   Limit caffeine (coffee, tea, caffeinated sodas) during the day, and don't have any for at least 6 hours before bedtime.  Avoid drinking alcohol before bedtime. Alcohol can cause you to wake up more often during the night.  Try not to smoke or use tobacco, especially in the evening. Nicotine can keep you awake.  Limit naps during the day, especially close to bedtime.  Avoid lying in bed awake for too long. If you can't fall asleep or if you wake up in the middle of the night and can't get back to sleep within about 20 minutes, get out of bed and go to another room until you feel sleepy.  Avoid taking medicine right before bed that may keep you awake or make you feel hyper or energized. Your doctor can tell you if your medicine may do this and if you can take it earlier in the day.  If you can't sleep   Imagine yourself in a peaceful, pleasant scene. Focus on the details and feelings of being in a place that is relaxing.  Get up and do a quiet or boring activity until you feel sleepy.  Avoid drinking any liquids before going to bed to help prevent waking up often to use the bathroom.  Where can you learn more?  Go to https://www.[employer].net/patiented  Enter J942 in the search box to learn more about \"Learning About Sleeping Well.\"  Current as of: July 31, 2024  Content Version: 14.3    2024 [employer].   Care instructions adapted under license by your healthcare professional. If you have questions about a medical condition or this instruction, always ask your healthcare professional. [employer] disclaims any " warranty or liability for your use of this information.    Chronic Pain: Care Instructions  Your Care Instructions     Chronic pain is pain that lasts a long time (months or even years) and may or may not have a clear cause. It is different from acute pain, which usually does have a clear cause--like an injury or illness--and gets better over time. Chronic pain:  Lasts over time but may vary from day to day.  Does not go away despite efforts to end it.  May disrupt your sleep and lead to fatigue.  May cause depression or anxiety.  May make your muscles tense, causing more pain.  Can disrupt your work, hobbies, home life, and relationships with friends and family.  Chronic pain is a very real condition. It is not just in your head. Treatment can help and usually includes several methods used together, such as medicines, physical therapy, exercise, and other treatments. Learning how to relax and changing negative thought patterns can also help you cope.  Chronic pain is complex. Taking an active role in your treatment will help you better manage your pain. Tell your doctor if you have trouble dealing with your pain. You may have to try several things before you find what works best for you.  Follow-up care is a key part of your treatment and safety. Be sure to make and go to all appointments, and call your doctor if you are having problems. It's also a good idea to know your test results and keep a list of the medicines you take.  How can you care for yourself at home?  Pace yourself. Break up large jobs into smaller tasks. Save harder tasks for days when you have less pain, or go back and forth between hard tasks and easier ones. Take rest breaks.  Relax, and reduce stress. Relaxation techniques such as deep breathing or meditation can help.  Keep moving. Gentle, daily exercise can help reduce pain over the long run. Try low- or no-impact exercises such as walking, swimming, and stationary biking. Do stretches to  stay flexible.  Try heat, cold packs, and massage.  Get enough sleep. Chronic pain can make you tired and drain your energy. Talk with your doctor if you have trouble sleeping because of pain.  Think positive. Your thoughts can affect your pain level. Do things that you enjoy to distract yourself when you have pain instead of focusing on the pain. See a movie, read a book, listen to music, or spend time with a friend.  If you think you are depressed, talk to your doctor about treatment.  Keep a daily pain diary. Record how your moods, thoughts, sleep patterns, activities, and medicine affect your pain. You may find that your pain is worse during or after certain activities or when you are feeling a certain emotion. Having a record of your pain can help you and your doctor find the best ways to treat your pain.  Take pain medicines exactly as directed.  If the doctor gave you a prescription medicine for pain, take it as prescribed.  If you are not taking a prescription pain medicine, ask your doctor if you can take an over-the-counter medicine.  Reducing constipation caused by pain medicine  Talk to your doctor about a laxative. If a laxative doesn't work, your doctor may suggest a prescription medicine.  Include fruits, vegetables, beans, and whole grains in your diet each day. These foods are high in fiber.  If your doctor recommends it, get more exercise. Walking is a good choice. Bit by bit, increase the amount you walk every day. Try for at least 30 minutes on most days of the week.  Schedule time each day for a bowel movement. A daily routine may help. Take your time and do not strain when having a bowel movement.  When should you call for help?   Call your doctor now or seek immediate medical care if:    Your pain gets worse or is out of control.     You feel down or blue, or you do not enjoy things like you once did. You may be depressed, which is common in people with chronic pain. Depression can be treated.  "    You have vomiting or cramps for more than 2 hours.   Watch closely for changes in your health, and be sure to contact your doctor if:    You cannot sleep because of pain.     You are very worried or anxious about your pain.     You have trouble taking your pain medicine.     You have any concerns about your pain medicine.     You have trouble with bowel movements, such as:  No bowel movement in 3 days.  Blood in the anal area, in your stool, or on the toilet paper.  Diarrhea for more than 24 hours.   Where can you learn more?  Go to https://www.[employer].net/patiented  Enter N004 in the search box to learn more about \"Chronic Pain: Care Instructions.\"  Current as of: July 31, 2024  Content Version: 14.3    2024 [employer].   Care instructions adapted under license by your healthcare professional. If you have questions about a medical condition or this instruction, always ask your healthcare professional. [employer] disclaims any warranty or liability for your use of this information.       "

## 2025-02-24 NOTE — PROGRESS NOTES
Preventive Care Visit  [facility] Angelina Rojas MD, Internal Medicine  Feb 24, 2025      Assessment & Plan   Problem List Items Addressed This Visit          Respiratory    Paraesophageal hernia     S/p paraesophageal hernia repair and fundoplication 2/4/25.   - Continue to follow up with surgery             Digestive    Esophageal dysphagia     Ongoing, esophageal dysmotility seen on manometry 12/2024.   - Continue to follow with [facility], may see their esophageal specialist  - Continue with lifestyle modifications  - Continue omeprazole 20 mg daily          Gastroesophageal reflux disease with esophagitis without hemorrhage     Reasonably-controlled with omeprazole 20 mg daily. Did just get fundoplication and esophageal hernia repair.   - May be able to taper PPI in the future            Endocrine    Hyperlipidemia     Due for repeat lipids. ASCVD risk low previously.          Relevant Orders    Lipid panel reflex to direct LDL Non-fasting    Nontoxic multinodular goiter     Chronic. Last US 3/5/24, showed stable/smaller thyroid nodules. Largest 1.1 cm - was previously TIRADS4 on US 3/2023 (f/up recommended 1, 2, 3 and 5 years).  - Next US due 3/2025, ordered today along with TSH         Relevant Orders    TSH with free T4 reflex    US Thyroid    Prediabetes     Last A1c 5.9 (2/2024).  Discussed healthy diet and exercise for management.  - Repeat A1c ordered         Relevant Orders    Hemoglobin A1c       Musculoskeletal and Integumentary    Other atopic dermatitis     R ear flaking and itching may be due to eczema. Will have her trial triamcinolone ointment.          Relevant Medications    triamcinolone (KENALOG) 0.1 % external ointment       Other    Encounter for Medicare annual wellness exam - Primary     We discussed healthy lifestyle, nutrition, cardiovascular risk reduction, self care, safety, sunscreen, and timing of cancer screening.  Health maintenance screening and  "immunizations reviewed with the patient.    - Will update labs today  - She declines PCV20, flu, Tdap or shingles vaccines today   - Mammogram due in march, ordered  - Colonoscopy due 10/2027  - BMD normal 3/2023  - Follow up yearly for the annual physical.         Relevant Orders    Hepatic panel (Albumin, ALT, AST, Bili, Alk Phos, TP)     Other Visit Diagnoses       Encounter for screening mammogram for malignant neoplasm of breast        Relevant Orders    MA Screen Bilateral w/Dario           The longitudinal plan of care for the diagnosis(es)/condition(s) as documented were addressed during this visit. Due to the added complexity in care, I will continue to support Merry in the subsequent management and with ongoing continuity of care.    Patient has been advised of split billing requirements and indicates understanding: Yes       BMI  Estimated body mass index is 25.96 kg/m  as calculated from the following:    Height as of this encounter: 1.692 m (5' 6.6\").    Weight as of this encounter: 74.3 kg (163 lb 12.8 oz).   Weight management plan: Discussed healthy diet and exercise guidelines    Counseling  Appropriate preventive services were addressed with this patient via screening, questionnaire, or discussion as appropriate for fall prevention, nutrition, physical activity, Tobacco-use cessation, social engagement, weight loss and cognition.  Checklist reviewing preventive services available has been given to the patient.  Reviewed patient's diet, addressing concerns and/or questions.   She is at risk for lack of exercise and has been provided with information to increase physical activity for the benefit of her well-being.   Discussed possible causes of fatigue. I have reviewed Opioid Use Disorder and Substance Use Disorder risk factors and made any needed referrals.     FUTURE APPOINTMENTS:       - Follow-up for annual visit or as needed      Subjective   Merry is a 69 year old, presenting for the " following:  Annual Visit        2/24/2025     3:37 PM   Additional Questions   Roomed by Deven Razo   Accompanied by Self         HPI    Merry is here for AWV today.     Got hernia repair on 2/4/25. Tomorrow is 3 weeks. Still on full liquid diet which is frustrating. Has follow up tomorrow.     GERD: omeprazole 20 mg daily, likely will be able to start tapering soon    Pre-DM: A1c 5.9 (10/2024)    Multinodular goiter: Last US 3/5/24, showed stable/smaller thyroid nodules. Largest 1.1 cm - was previously TIRADS4 on US 3/2023 (f/up recommended 1, 2, 3 and 5 years).     R ear flaking and itching.     HCM: Colonoscopy 4/2022, got CT colography 4/2022, no polyps, 5 years. Mammo 3/5/24 BIRADS1. Normal BMD 3/2023.     Health Care Directive  Patient does not have a Health Care Directive: Discussed advance care planning with patient; however, patient declined at this time.      2/24/2025   General Health   How would you rate your overall physical health? Excellent   Feel stress (tense, anxious, or unable to sleep) Only a little         2/24/2025   Nutrition   Diet: Other   If other, please elaborate: full liquid         2/24/2025   Exercise   Days per week of moderate/strenous exercise 3 days   Average minutes spent exercising at this level 30 min         2/24/2025   Social Factors   Frequency of gathering with friends or relatives Twice a week   Worry food won't last until get money to buy more No   Food not last or not have enough money for food? No   Do you have housing? (Housing is defined as stable permanent housing and does not include staying ouside in a car, in a tent, in an abandoned building, in an overnight shelter, or couch-surfing.) Yes   Are you worried about losing your housing? No   Lack of transportation? No   Unable to get utilities (heat,electricity)? No         2/24/2025   Fall Risk   Fallen 2 or more times in the past year? No   Trouble with walking or balance? No          2/24/2025   Activities of  Daily Living- Home Safety   Needs help with the following daily activites None of the above   Safety concerns in the home None of the above         2025   Dental   Dentist two times every year? Yes         2025   Hearing Screening   Hearing concerns? None of the above         2025   Driving Risk Screening   Patient/family members have concerns about driving No         2025   General Alertness/Fatigue Screening   Have you been more tired than usual lately? (!) YES         2025   Urinary Incontinence Screening   Bothered by leaking urine in past 6 months No            Today's PHQ-2 Score:       2025     3:34 PM   PHQ-2 (  [employer])   Q1: Little interest or pleasure in doing things 0   Q2: Feeling down, depressed or hopeless 0   PHQ-2 Score 0    Q1: Little interest or pleasure in doing things Not at all   Q2: Feeling down, depressed or hopeless Not at all   PHQ-2 Score 0       Patient-reported           2025   Substance Use   Alcohol more than 3/day or more than 7/wk No   Do you have a current opioid prescription? (!) YES   How severe/bad is pain from 1 to 10? 1/10   Do you use any other substances recreationally? No         2025     4:29 PM   OPIOID RISK TOOL TOTAL SCORE   Total Score 0     Low Risk (0-3)  Moderate Risk (4-7)  High Risk (>8)  Social History     Tobacco Use    Smoking status: Former     Current packs/day: 0.00     Average packs/day: 1 pack/day for 20.0 years (20.0 ttl pk-yrs)     Types: Cigarettes     Start date: 1973     Quit date: 3/11/1987     Years since quittin.9    Smokeless tobacco: Never   Substance Use Topics    Alcohol use: Yes     Comment: Very little    Drug use: Never          Mammogram Screening - Mammogram every 1-2 years updated in Health Maintenance based on mutual decision making      History of abnormal Pap smear: No - age 65 or older with adequate negative prior screening test results (3 consecutive negative cytology results, 2  consecutive negative cotesting results, or 2 consecutive negative HrHPV test results within 10 years, with the most recent test occurring within the recommended screening interval for the test used)       ASCVD Risk   The ASCVD Risk score (Zofia MCGEE, et al., 2019) failed to calculate for the following reasons:    Cannot find a previous HDL lab    Cannot find a previous total cholesterol lab          Reviewed and updated as needed this visit by Provider   Tobacco  Allergies  Meds  Problems  Med Hx  Surg Hx  Fam Hx     Sexual Activity          Past Medical History:   Diagnosis Date    Abscess of esophagus 03/27/2024    Formatting of this note might be different from the original.   Noted on EGD 3/2024, biopsies/EUS done.  Pathology showed submucosal abscess, which was biopsied/drained at the time of EUS, benign pathology.      Arthritis      Past Surgical History:   Procedure Laterality Date    ABDOMEN SURGERY      BACK SURGERY      BIOPSY      CHOLECYSTECTOMY      COLONOSCOPY      ENT SURGERY      GENITOURINARY SURGERY      GYN SURGERY      ORTHOPEDIC SURGERY       Current providers sharing in care for this patient include:  Patient Care Team:  Noris Rojas MD as PCP - General (Internal Medicine)  Noris Rojas MD as Assigned PCP    The following health maintenance items are reviewed in Epic and correct as of today:  Health Maintenance   Topic Date Due    LIPID  Never done    ANNUAL REVIEW OF HM ORDERS  Never done    Pneumococcal Vaccine: 50+ Years (1 of 1 - PCV) Never done    ZOSTER IMMUNIZATION (2 of 3) 01/05/2018    DTAP/TDAP/TD IMMUNIZATION (2 - Td or Tdap) 11/09/2022    INFLUENZA VACCINE (1) 09/01/2024    COVID-19 Vaccine (1 - 2024-25 season) Never done    MEDICARE ANNUAL WELLNESS VISIT  02/24/2026    FALL RISK ASSESSMENT  02/24/2026    MAMMO SCREENING  03/05/2026    COLORECTAL CANCER SCREENING  04/26/2027    GLUCOSE  01/20/2028    ADVANCE CARE PLANNING  02/24/2030     "RSV VACCINE (1 - 1-dose 75+ series) 09/07/2032    DEXA  03/01/2038    HEPATITIS C SCREENING  Completed    PHQ-2 (once per calendar year)  Completed    HPV IMMUNIZATION  Aged Out    MENINGITIS IMMUNIZATION  Aged Out    PAP  Discontinued     Review of Systems  Constitutional, neuro, ENT, endocrine, pulmonary, cardiac, gastrointestinal, genitourinary, musculoskeletal, integument and psychiatric systems are negative, except as otherwise noted.     Objective    Exam  /60   Pulse 70   Temp 98  F (36.7  C) (Oral)   Resp 12   Ht 1.692 m (5' 6.6\")   Wt 74.3 kg (163 lb 12.8 oz)   LMP  (LMP Unknown)   SpO2 94%   BMI 25.96 kg/m     Estimated body mass index is 25.96 kg/m  as calculated from the following:    Height as of this encounter: 1.692 m (5' 6.6\").    Weight as of this encounter: 74.3 kg (163 lb 12.8 oz).    Physical Exam  GENERAL: alert and no distress  EYES: Eyes grossly normal to inspection and conjunctivae and sclerae normal  HENT: R ear canal with some flaking skin, b/l TM's normal, nose and mouth without ulcers or lesions  NECK: no adenopathy, no asymmetry, masses, or scars  RESP: lungs clear to auscultation - no rales, rhonchi or wheezes  CV: regular rate and rhythm, normal S1 S2, no S3 or S4, no murmur, click or rub, no peripheral edema  ABDOMEN: soft, nontender, no hepatosplenomegaly, no masses and bowel sounds normal; well healing incision sites   MS: no gross musculoskeletal defects noted, no edema  SKIN: no suspicious lesions or rashes on exposed skin   NEURO: Normal strength and tone, mentation intact and speech normal  PSYCH: mentation appears normal, affect normal/bright        2/24/2025   Mini Cog   Clock Draw Score 2 Normal   3 Item Recall 3 objects recalled   Mini Cog Total Score 5              Signed Electronically by: Noris Rojas MD    "

## 2025-02-24 NOTE — ASSESSMENT & PLAN NOTE
Chronic. Last US 3/5/24, showed stable/smaller thyroid nodules. Largest 1.1 cm - was previously TIRADS4 on US 3/2023 (f/up recommended 1, 2, 3 and 5 years).  - Next US due 3/2025, ordered today along with TSH

## 2025-02-24 NOTE — ASSESSMENT & PLAN NOTE
S/p paraesophageal hernia repair and fundoplication 2/4/25.   - Continue to follow up with surgery

## 2025-03-07 ENCOUNTER — HOSPITAL ENCOUNTER (OUTPATIENT)
Dept: ULTRASOUND IMAGING | Facility: HOSPITAL | Age: 70
Discharge: HOME | End: 2025-03-07
Attending: INTERNAL MEDICINE
Payer: COMMERCIAL

## 2025-03-07 ENCOUNTER — ANCILLARY PROCEDURE (OUTPATIENT)
Dept: MAMMOGRAPHY | Facility: CLINIC | Age: 70
End: 2025-03-07
Attending: INTERNAL MEDICINE
Payer: COMMERCIAL

## 2025-03-07 DIAGNOSIS — Z12.31 ENCOUNTER FOR SCREENING MAMMOGRAM FOR MALIGNANT NEOPLASM OF BREAST: ICD-10-CM

## 2025-03-07 DIAGNOSIS — E04.2 NONTOXIC MULTINODULAR GOITER: ICD-10-CM

## 2025-03-07 PROCEDURE — 77063 BREAST TOMOSYNTHESIS BI: CPT

## 2025-03-07 PROCEDURE — 76536 US EXAM OF HEAD AND NECK: CPT

## 2025-03-11 ENCOUNTER — ANCILLARY ORDERS (OUTPATIENT)
Dept: RADIOLOGY | Facility: CLINIC | Age: 70
End: 2025-03-11

## 2025-03-13 ENCOUNTER — LAB (OUTPATIENT)
Dept: LAB | Facility: CLINIC | Age: 70
End: 2025-03-13
Payer: COMMERCIAL

## 2025-03-13 DIAGNOSIS — E04.2 NONTOXIC MULTINODULAR GOITER: ICD-10-CM

## 2025-03-13 DIAGNOSIS — Z00.00 ENCOUNTER FOR MEDICARE ANNUAL WELLNESS EXAM: ICD-10-CM

## 2025-03-13 DIAGNOSIS — R73.03 PREDIABETES: ICD-10-CM

## 2025-03-13 DIAGNOSIS — E78.2 MIXED HYPERLIPIDEMIA: ICD-10-CM

## 2025-03-13 LAB
ALBUMIN SERPL BCG-MCNC: 4.4 G/DL (ref 3.5–5.2)
ALP SERPL-CCNC: 67 U/L (ref 40–150)
ALT SERPL W P-5'-P-CCNC: 13 U/L (ref 0–50)
AST SERPL W P-5'-P-CCNC: 29 U/L (ref 0–45)
BILIRUB DIRECT SERPL-MCNC: NORMAL MG/DL
BILIRUB SERPL-MCNC: 0.5 MG/DL
CHOLEST SERPL-MCNC: 204 MG/DL
EST. AVERAGE GLUCOSE BLD GHB EST-MCNC: 117 MG/DL
FASTING STATUS PATIENT QL REPORTED: YES
HBA1C MFR BLD: 5.7 % (ref 0–5.6)
HDLC SERPL-MCNC: 46 MG/DL
LDLC SERPL CALC-MCNC: 112 MG/DL
NONHDLC SERPL-MCNC: 158 MG/DL
PROT SERPL-MCNC: 6.9 G/DL (ref 6.4–8.3)
TRIGL SERPL-MCNC: 229 MG/DL
TSH SERPL DL<=0.005 MIU/L-ACNC: 1.95 UIU/ML (ref 0.3–4.2)

## 2025-04-15 ENCOUNTER — TRANSFERRED RECORDS (OUTPATIENT)
Dept: GASTROENTEROLOGY | Facility: CLINIC | Age: 70
End: 2025-04-15
Payer: COMMERCIAL

## 2025-04-16 NOTE — PROCEDURES
04/15/2025        Merry Peoples   [address] [place] [address]      Merry Peoples,  :  1955    I'm writing to let you know about the tests that were taken recently.   Thank you for allowing [facility] the opportunity to take part in your healthcare.  At [facility] we strive to provide each patient with the finest gastroenterology care available.  We hope your experience was pleasant and informative.    Blood counts, liver test, pancreas enzymes are normal.  Please follow-up as discussed.  It was wonderful to see you.  Lipase 2025 09:05   Description Result Units Flags Range   Lipase 40 U/L  14-72   Comments   Performed At: [facility], [facility] [address] [place], [place], [id number]  Robinson Reyes MD, Phone: [phone]   Hepatic Function Panel (7) 2025 09:05   Description Result Units Flags Range   Bilirubin, Total 0.4 mg/dL  0.0-1.2   Bilirubin, Direct 0.13 mg/dL  0.00-0.40   AST (SGOT) 21 IU/L  0-40   ALT (SGPT) 12 IU/L  0-32   Albumin 4.4 g/dL  3.9-4.9   Alkaline Phosphatase 80 IU/L     Protein, Total 6.4 g/dL  6.0-8.5   Comments   Performed At: [facility], [facility] [address] [place], [place], [id number]  Robinson Reyes MD, Phone: [phone]   CBC With Differential/Platelet 2025 09:05   Description Result Units Flags Range   Hemoglobin 14.7 g/dL  11.1-15.9   Hematocrit 44.4 %  34.0-46.6   MCV 86 fL  79-97   MCHC 33.1 g/dL  31.5-35.7   MCH 28.4 pg  26.6-33.0   RDW 13.2 %  11.7-15.4   Platelets 164 x10E3/uL  150-450   Neutrophils 53 %  Not Estab.   Lymphs 39 %  Not Estab.   Monocytes 5 %  Not Estab.   Eos 2 %  Not Estab.   Basos 1 %  Not Estab.   Neutrophils (Absolute) 2.7 x10E3/uL  1.4-7.0   Lymphs (Absolute) 2.0 x10E3/uL  0.7-3.1   Monocytes(Absolute) 0.3 x10E3/uL  0.1-0.9   Eos (Absolute) 0.1 x10E3/uL  0.0-0.4   Baso (Absolute) 0.1 x10E3/uL  0.0-0.2   Immature Grans (Abs) 0.0 x10E3/uL  0.0-0.1   Immature Granulocytes 0 %  Not Estab.   RBC 5.17 x10E6/uL  3.77-5.28    WBC 5.1 x10E3/uL  3.4-10.8   Comments   Performed At: [facility], [facility] [address], [place], [place], [id number]  Robinson Reyes MD, Phone: [phone]         I hope you are feeling well.  Please call sooner if you have a problem, otherwise I'll see you as we had previously scheduled.        Thank you.    Electronically signed by:  Tanja Lindsey MD 04/15/2025 01:07 PM  Document generated by:  Tanja Lindsey MD  04/15/2025  If your provider ordered multiple tests; the results may not become available at the same time.  If multiple test results are received within 14 days of one another, you may receive a duplicate.  cc:  Noris Rojas MD

## 2025-04-28 ENCOUNTER — HOSPITAL ENCOUNTER (OUTPATIENT)
Dept: NUCLEAR MEDICINE | Facility: HOSPITAL | Age: 70
Discharge: HOME | End: 2025-04-28
Attending: INTERNAL MEDICINE
Payer: COMMERCIAL

## 2025-04-28 DIAGNOSIS — R63.4 WEIGHT LOSS, UNINTENTIONAL: ICD-10-CM

## 2025-04-28 DIAGNOSIS — R13.10 DYSPHAGIA, UNSPECIFIED TYPE: ICD-10-CM

## 2025-04-28 DIAGNOSIS — R10.9 RIGHT SIDED ABDOMINAL PAIN: ICD-10-CM

## 2025-04-28 DIAGNOSIS — R19.5 LOOSE STOOLS: ICD-10-CM

## 2025-04-28 PROCEDURE — A9541 TC99M SULFUR COLLOID: HCPCS

## 2025-04-28 PROCEDURE — 78264 GASTRIC EMPTYING IMG STUDY: CPT

## 2025-04-28 PROCEDURE — 343N000001 HC RX 343 MED OP 636

## 2025-04-28 RX ADMIN — Medication 1 MILLICURIE: at 10:02

## 2025-04-29 ENCOUNTER — TRANSFERRED RECORDS (OUTPATIENT)
Dept: HEALTH INFORMATION MANAGEMENT | Facility: CLINIC | Age: 70
End: 2025-04-29
Payer: COMMERCIAL

## 2025-05-15 ENCOUNTER — TRANSFERRED RECORDS (OUTPATIENT)
Dept: GASTROENTEROLOGY | Facility: CLINIC | Age: 70
End: 2025-05-15
Payer: COMMERCIAL

## 2025-05-16 NOTE — PROGRESS NOTES
05/15/2025        Merry Peoples   [address] [place] [address]      Merry Peoples,  :  1955    I'm writing to let you know about the tests that were taken recently.   Thank you for allowing [facility] the opportunity to take part in your healthcare.  At [facility] we strive to provide each patient with the finest gastroenterology care available.  We hope your experience was pleasant and informative.    Breath test is positive for small intestinal bacterial overgrowth.  I recommend treatment with rifaximin.  I will send in a prescription.    I hope you are feeling well.  Please call sooner if you have a problem, otherwise I'll see you as we had previously scheduled.        Thank you.    Electronically signed by:  Tanja Lindsey MD 05/15/2025 12:05 PM  Document generated by:  Tanja Lindsey MD  05/15/2025  If your provider ordered multiple tests; the results may not become available at the same time.  If multiple test results are received within 14 days of one another, you may receive a duplicate.  cc:  Noris Rojas MD  cc:  Noris Rojas MD

## 2025-06-12 ENCOUNTER — TRANSCRIBE ORDERS (OUTPATIENT)
Dept: OTHER | Age: 70
End: 2025-06-12

## 2025-06-12 DIAGNOSIS — K22.4 INEFFECTIVE ESOPHAGEAL MOTILITY: ICD-10-CM

## 2025-06-12 DIAGNOSIS — K31.84 GASTROPARESIS: ICD-10-CM

## 2025-06-12 DIAGNOSIS — R49.9 CHANGE IN VOICE: ICD-10-CM

## 2025-06-12 DIAGNOSIS — R22.1 NECK FULLNESS: ICD-10-CM

## 2025-06-12 DIAGNOSIS — R13.10 DYSPHAGIA, UNSPECIFIED TYPE: ICD-10-CM

## 2025-06-12 DIAGNOSIS — K63.8219 SMALL INTESTINAL BACTERIAL OVERGROWTH: Primary | ICD-10-CM

## 2025-06-18 ENCOUNTER — TRANSFERRED RECORDS (OUTPATIENT)
Dept: GASTROENTEROLOGY | Facility: CLINIC | Age: 70
End: 2025-06-18
Payer: COMMERCIAL

## 2025-06-19 NOTE — PROCEDURES
2025        Merry Peoples   [address] [place] [address]      Merry Richelle,  :  1955    I'm writing to let you know about the tests that were taken recently.   Thank you for allowing [facility] the opportunity to take part in your healthcare.  At [facility] we strive to provide each patient with the finest gastroenterology care available.  We hope your experience was pleasant and informative.    Labs to look at some of the antibodies which can be seen in the setting of scleroderma are negative.  If you have persistent concern regarding this possibility, I would recommend follow-up with your primary care provider or rheumatology.  Scleroderma Diagnostic Profile 2025 13:56   Description Result Units Flags Range   RICARDO Direct Negative   Negative   Antiscleroderma-70 Antibodies <0.2 AI  0.0-0.9   Comments   Performed At: [facility], [facility] [place]  [address], [place], [place], [id number]  Robinson Reyes MD, Phone: [phone]         I hope you are feeling well.  Please call sooner if you have a problem, otherwise I'll see you as we had previously scheduled.        Thank you.    Electronically signed by:  Tanja Lindsey MD 2025 05:02 PM  Document generated by:  Tanja Lindsey MD  2025  If your provider ordered multiple tests; the results may not become available at the same time.  If multiple test results are received within 14 days of one another, you may receive a duplicate.  cc:  Noris Rojas MD

## 2025-07-16 ENCOUNTER — HOSPITAL ENCOUNTER (OUTPATIENT)
Dept: RADIOLOGY | Facility: HOSPITAL | Age: 70
Discharge: HOME | End: 2025-07-16
Attending: INTERNAL MEDICINE
Payer: COMMERCIAL

## 2025-07-16 ENCOUNTER — HOSPITAL ENCOUNTER (OUTPATIENT)
Dept: SPEECH THERAPY | Facility: HOSPITAL | Age: 70
Discharge: HOME | End: 2025-07-16
Attending: INTERNAL MEDICINE
Payer: COMMERCIAL

## 2025-07-16 DIAGNOSIS — K22.4 INEFFECTIVE ESOPHAGEAL MOTILITY: ICD-10-CM

## 2025-07-16 DIAGNOSIS — R49.9 CHANGE IN VOICE: ICD-10-CM

## 2025-07-16 DIAGNOSIS — R22.1 NECK FULLNESS: ICD-10-CM

## 2025-07-16 DIAGNOSIS — K63.8219 SMALL INTESTINAL BACTERIAL OVERGROWTH: ICD-10-CM

## 2025-07-16 DIAGNOSIS — K31.84 GASTROPARESIS: ICD-10-CM

## 2025-07-16 DIAGNOSIS — R13.10 DYSPHAGIA, UNSPECIFIED TYPE: ICD-10-CM

## 2025-07-16 PROCEDURE — 92610 EVALUATE SWALLOWING FUNCTION: CPT | Mod: GN

## 2025-07-16 PROCEDURE — 74230 X-RAY XM SWLNG FUNCJ C+: CPT

## 2025-07-16 PROCEDURE — 92611 MOTION FLUOROSCOPY/SWALLOW: CPT | Mod: GN

## 2025-07-16 NOTE — PROGRESS NOTES
SPEECH LANGUAGE PATHOLOGY EVALUATION       Fall Risk Screen:  Have you fallen 2 or more times in the past year?: Yes  Have you fallen and had an injury in the past year?: No  Is patient receiving Physical Therapy Services?: No    Subjective        Presenting condition or subjective complaint: Swallowing difficulty  Date of service: 7/16/2025  Relevant medical history:   Reflux, dysmotility, thyroid nodules, 11/12/2024 EGD with peptic esophagitis, large hiatal hernia, esophagram 2/26/2025, hiatal hernia repair in February 2025, 3/14/2025 EGD with dilation, 4/30/2025 gastroparesis and solid food dysphagia following hernia repair.  ENT appointment next week.    Prior diagnostic imaging/testing results: Esophagram and EGD  Prior therapy history for the same diagnosis, illness or injury: No      Living Environment  Social support: With a significant other or spouse   Help at home: None  Equipment owned:       Employment: Not Applicable    Hobbies/Interests:      Patient goals for therapy: Swallow food without it feeling stuck       Objective     SWALLOW EVALUATION  Dysphagia history: Patient reports sensation of food and liquid getting stuck on the right side of her throat.  She is already using several strategies while eating and drinking given extensive esophageal history.  Current Diet/Method of Nutritional Intake: thin liquids (level 0), regular diet        CLINICAL SWALLOW EVALUATION  Oral Motor Function: Dentition: adequate dentition  Secretion management: WFL  Mucosal quality: good  Mandibular function: intact  Oral labial function: WFL  Lingual function: WFL  Velar function: intact   Buccal function: intact  Laryngeal function: throat clear, volitional swallow, voicing WFL (patient reports some hoarseness with voice).    Level of assist required for feeding: no assistance needed   Textures Trialed:   Clinical Swallow Eval: Thin Liquids  Mode of presentation: cup, straw   Volume presented: 3 ounces  Preparatory  Phase: WFL  Oral Phase: WFL  Pharyngeal phase of swallow: intact   Diagnostic statement: WNL    VIDEOFLUOROSCOPIC SWALLOW STUDY  Radiologist: Dr. Guan  Views Taken: left lateral   Physical location of procedure: [facility]    VFSS textures trialed:   VFSS Eval: Thin Liquids  Mode of Presentation: cup, straw   Order of Presentation: 1,2,3,7(A-P)  Preparatory Phase: WFL  Oral Phase: WFL  Bolus Location When Swallow Initiated: posterior angle of ramus  Pharyngeal Phase: residue in pyriform sinus  Rosenbeck's Penetration Aspiration Scale: 1 - no aspiration, contrast does not enter airway  Diagnostic Statement: Small collection of fluid in left pharyngeal diverticulum drops to the left piriform sinus with relaxation and is cleared with spontaneous subsequent swallow (see AP view).    VFSS Eval: Solids  Mode of Presentation: cup, spoon   Order of Presentation: 4(cracker), 6(puree), 5(barium tablet)  Preparatory Phase: WFL  Oral Phase: WFL, residue in oral cavity  Bolus Location When Swallow Initiated: posterior angle of ramus  Pharyngeal Phase: WFL   Rosenbeck s Penetration Aspiration Scale: 1 - no aspiration, contrast does not enter airway  Diagnostic Statement: Slight oral residue with cracker coated in barium, pt completes spontaneous secondary swallow and clears. Puree and tablet are observed in A-P view with esophageal sweep and pharyngeal phase is not observed.  Tablet does not pass into the stomach with several sips of water but does pass with purée bolus.    ESOPHAGEAL PHASE OF SWALLOW  patient reports symptoms of esophageal dysphagia  patient presents with symptoms of esophageal dysphagia  esophageal sweep performed during today's videofluoroscopic exam  please refer to radiologist's report for details     SWALLOW ASSESSMENT CLINICAL IMPRESSIONS AND RATIONALE  Diet Consistency Recommendations: thin liquids (level 0), regular diet    Recommended Feeding/Eating Techniques: maintain upright posture  during/after eating for 30 minutes   Medication Administration Recommendations: Patient preference, consider taking with purée    Assessment & Plan   CLINICAL IMPRESSIONS   Impression/Assessment: Videofluoroscopic Swallow Study completed. Patient had no aspiration or penetration with any consistency. Oral phase is WFL. Tongue base retraction was WFL. Swallow response was timely. Epiglottic inversion was complete.   Small collection of fluid in left pharyngeal diverticulum drops to the left piriform sinus with relaxation and is cleared with spontaneous subsequent swallow (see AP view). Slight oral residue with cracker coated in barium, pt completes spontaneous secondary swallow and clears. Hyolaryngeal elevation was WFL and hyolaryngeal excursion was WFL.  Mastication WFL. Cricopharyngeus WFL.  Reviewed results and recommendations with the patient following the study.      PLAN OF CARE  Recommended Referrals to Other Professionals: Follow-up with ENT next week and GI per their discretion  Education Assessment:   Learner/Method: Patient;Significant Other;Listening;Pictures/Video;No Barriers to Learning    Risks and benefits of evaluation/treatment have been explained.   Patient/Family/caregiver agrees with Plan of Care.     Evaluation Time:    SLP Eval: oral/pharyngeal swallow function, clinical minutes (22379): 15  SLP Eval: VideoFluoroscopic Swallow function Minutes (76411): 10    Signing Clinician: ZHOU Silva      [facility]                                                                                   OUTPATIENT SPEECH LANGUAGE PATHOLOGY

## 2025-08-07 ENCOUNTER — TRANSFERRED RECORDS (OUTPATIENT)
Dept: HEALTH INFORMATION MANAGEMENT | Facility: CLINIC | Age: 70
End: 2025-08-07
Payer: COMMERCIAL